# Patient Record
Sex: MALE | Race: WHITE | NOT HISPANIC OR LATINO | Employment: FULL TIME | URBAN - METROPOLITAN AREA
[De-identification: names, ages, dates, MRNs, and addresses within clinical notes are randomized per-mention and may not be internally consistent; named-entity substitution may affect disease eponyms.]

---

## 2019-05-14 ENCOUNTER — OCCMED (OUTPATIENT)
Dept: URGENT CARE | Facility: CLINIC | Age: 59
End: 2019-05-14

## 2019-05-14 ENCOUNTER — APPOINTMENT (OUTPATIENT)
Dept: RADIOLOGY | Facility: CLINIC | Age: 59
End: 2019-05-14
Payer: OTHER MISCELLANEOUS

## 2019-05-14 DIAGNOSIS — M25.561 ACUTE PAIN OF RIGHT KNEE: ICD-10-CM

## 2019-05-14 DIAGNOSIS — M25.561 ACUTE PAIN OF RIGHT KNEE: Primary | ICD-10-CM

## 2019-05-14 PROCEDURE — 73564 X-RAY EXAM KNEE 4 OR MORE: CPT

## 2019-05-16 ENCOUNTER — OCCMED (OUTPATIENT)
Dept: URGENT CARE | Facility: CLINIC | Age: 59
End: 2019-05-16
Payer: OTHER MISCELLANEOUS

## 2019-05-16 DIAGNOSIS — M25.561 ACUTE PAIN OF RIGHT KNEE: Primary | ICD-10-CM

## 2019-05-16 PROCEDURE — 99213 OFFICE O/P EST LOW 20 MIN: CPT | Performed by: PHYSICIAN ASSISTANT

## 2019-05-20 ENCOUNTER — OCCMED (OUTPATIENT)
Dept: URGENT CARE | Facility: CLINIC | Age: 59
End: 2019-05-20
Payer: OTHER MISCELLANEOUS

## 2019-05-20 DIAGNOSIS — M25.561 ACUTE PAIN OF RIGHT KNEE: Primary | ICD-10-CM

## 2019-05-20 PROCEDURE — 99213 OFFICE O/P EST LOW 20 MIN: CPT | Performed by: NURSE PRACTITIONER

## 2019-06-03 ENCOUNTER — OCCMED (OUTPATIENT)
Dept: URGENT CARE | Facility: CLINIC | Age: 59
End: 2019-06-03
Payer: OTHER MISCELLANEOUS

## 2019-06-03 ENCOUNTER — TRANSCRIBE ORDERS (OUTPATIENT)
Dept: ADMINISTRATIVE | Facility: HOSPITAL | Age: 59
End: 2019-06-03

## 2019-06-03 DIAGNOSIS — M25.561 ACUTE PAIN OF RIGHT KNEE: Primary | ICD-10-CM

## 2019-06-03 DIAGNOSIS — M25.561 RIGHT KNEE PAIN, UNSPECIFIED CHRONICITY: Primary | ICD-10-CM

## 2019-06-03 PROCEDURE — 99213 OFFICE O/P EST LOW 20 MIN: CPT | Performed by: NURSE PRACTITIONER

## 2019-06-12 ENCOUNTER — HOSPITAL ENCOUNTER (OUTPATIENT)
Dept: MRI IMAGING | Facility: HOSPITAL | Age: 59
Discharge: HOME | End: 2019-06-12
Payer: OTHER MISCELLANEOUS

## 2019-06-12 DIAGNOSIS — M25.561 RIGHT KNEE PAIN, UNSPECIFIED CHRONICITY: ICD-10-CM

## 2019-06-12 PROCEDURE — 73721 MRI JNT OF LWR EXTRE W/O DYE: CPT

## 2019-06-18 ENCOUNTER — APPOINTMENT (OUTPATIENT)
Dept: RADIOLOGY | Facility: CLINIC | Age: 59
End: 2019-06-18
Payer: OTHER MISCELLANEOUS

## 2019-06-18 ENCOUNTER — OFFICE VISIT (OUTPATIENT)
Dept: OBGYN CLINIC | Facility: CLINIC | Age: 59
End: 2019-06-18
Payer: OTHER MISCELLANEOUS

## 2019-06-18 VITALS
WEIGHT: 274 LBS | HEART RATE: 84 BPM | SYSTOLIC BLOOD PRESSURE: 132 MMHG | DIASTOLIC BLOOD PRESSURE: 82 MMHG | HEIGHT: 71 IN | BODY MASS INDEX: 38.36 KG/M2

## 2019-06-18 DIAGNOSIS — M25.461 EFFUSION OF RIGHT KNEE: ICD-10-CM

## 2019-06-18 DIAGNOSIS — S89.91XA KNEE INJURY, RIGHT, INITIAL ENCOUNTER: ICD-10-CM

## 2019-06-18 DIAGNOSIS — M17.11 PRIMARY OSTEOARTHRITIS OF RIGHT KNEE: Primary | ICD-10-CM

## 2019-06-18 PROCEDURE — 73564 X-RAY EXAM KNEE 4 OR MORE: CPT

## 2019-06-18 PROCEDURE — 99203 OFFICE O/P NEW LOW 30 MIN: CPT | Performed by: ORTHOPAEDIC SURGERY

## 2019-06-18 PROCEDURE — 20610 DRAIN/INJ JOINT/BURSA W/O US: CPT | Performed by: ORTHOPAEDIC SURGERY

## 2019-06-18 RX ORDER — COLCHICINE 0.6 MG/1
0.6 TABLET, FILM COATED ORAL DAILY
Refills: 3 | COMMUNITY
Start: 2019-06-05 | End: 2022-04-20

## 2019-06-18 RX ORDER — ALLOPURINOL 100 MG/1
200 TABLET ORAL DAILY
Refills: 3 | COMMUNITY
Start: 2019-05-20

## 2019-06-18 RX ORDER — METFORMIN HYDROCHLORIDE 500 MG/1
TABLET, EXTENDED RELEASE ORAL
Refills: 2 | COMMUNITY
Start: 2019-05-20

## 2019-06-18 RX ORDER — BETAMETHASONE SODIUM PHOSPHATE AND BETAMETHASONE ACETATE 3; 3 MG/ML; MG/ML
6 INJECTION, SUSPENSION INTRA-ARTICULAR; INTRALESIONAL; INTRAMUSCULAR; SOFT TISSUE
Status: COMPLETED | OUTPATIENT
Start: 2019-06-18 | End: 2019-06-18

## 2019-06-18 RX ORDER — EXENATIDE 2 MG/.65ML
INJECTION, SUSPENSION, EXTENDED RELEASE SUBCUTANEOUS
Refills: 2 | COMMUNITY
Start: 2019-05-29

## 2019-06-18 RX ORDER — DESLORATADINE 5 MG/1
5 TABLET ORAL DAILY
Refills: 4 | COMMUNITY
Start: 2019-06-05 | End: 2022-04-20

## 2019-06-18 RX ORDER — SIMVASTATIN 80 MG
80 TABLET ORAL EVERY EVENING
Refills: 4 | COMMUNITY
Start: 2019-05-20 | End: 2022-04-20

## 2019-06-18 RX ORDER — LIDOCAINE HYDROCHLORIDE 10 MG/ML
7 INJECTION, SOLUTION EPIDURAL; INFILTRATION; INTRACAUDAL; PERINEURAL
Status: COMPLETED | OUTPATIENT
Start: 2019-06-18 | End: 2019-06-18

## 2019-06-18 RX ORDER — LISINOPRIL AND HYDROCHLOROTHIAZIDE 12.5; 1 MG/1; MG/1
1 TABLET ORAL DAILY
Refills: 2 | COMMUNITY
Start: 2019-06-05

## 2019-06-18 RX ORDER — METHOTREXATE 2.5 MG/1
TABLET ORAL
Refills: 1 | COMMUNITY
Start: 2019-06-07 | End: 2022-04-20

## 2019-06-18 RX ORDER — FEXOFENADINE HCL 180 MG/1
180 TABLET ORAL DAILY
COMMUNITY

## 2019-06-18 RX ADMIN — BETAMETHASONE SODIUM PHOSPHATE AND BETAMETHASONE ACETATE 6 MG: 3; 3 INJECTION, SUSPENSION INTRA-ARTICULAR; INTRALESIONAL; INTRAMUSCULAR; SOFT TISSUE at 08:57

## 2019-06-18 RX ADMIN — LIDOCAINE HYDROCHLORIDE 7 ML: 10 INJECTION, SOLUTION EPIDURAL; INFILTRATION; INTRACAUDAL; PERINEURAL at 08:57

## 2019-06-19 ENCOUNTER — TELEPHONE (OUTPATIENT)
Dept: OBGYN CLINIC | Facility: HOSPITAL | Age: 59
End: 2019-06-19

## 2019-07-11 ENCOUNTER — OFFICE VISIT (OUTPATIENT)
Dept: OBGYN CLINIC | Facility: CLINIC | Age: 59
End: 2019-07-11
Payer: OTHER MISCELLANEOUS

## 2019-07-11 VITALS
SYSTOLIC BLOOD PRESSURE: 132 MMHG | BODY MASS INDEX: 38.5 KG/M2 | HEIGHT: 71 IN | WEIGHT: 275 LBS | DIASTOLIC BLOOD PRESSURE: 80 MMHG | HEART RATE: 80 BPM

## 2019-07-11 DIAGNOSIS — M17.11 PRIMARY OSTEOARTHRITIS OF RIGHT KNEE: Primary | ICD-10-CM

## 2019-07-11 DIAGNOSIS — M25.461 EFFUSION OF RIGHT KNEE: ICD-10-CM

## 2019-07-11 PROCEDURE — 99213 OFFICE O/P EST LOW 20 MIN: CPT | Performed by: PHYSICIAN ASSISTANT

## 2019-07-11 NOTE — PROGRESS NOTES
Assessment:     1  Primary osteoarthritis of right knee    2  Effusion of right knee        Plan:     Problem List Items Addressed This Visit        Musculoskeletal and Integument    Primary osteoarthritis of right knee - Primary    Relevant Medications    diclofenac sodium (VOLTAREN) 1 %    Effusion of right knee    Relevant Medications    diclofenac sodium (VOLTAREN) 1 %            Findings consistent with right knee osteoarthritis with effusion possible degenerative medial meniscus tear  Discussed findings and treatment options with the patient  Patient had very short-term relief with the cortisone injection  Pain and swelling has returned  He continues to experience mechanical symptoms  Recommend follow-up with Dr Janel Bojorquez next week to discuss possible right knee arthroscopy  I prescribed him Voltaren gel to use as needed for pain since he is limited on taking oral NSAIDs  Continue current work restrictions and use of hinged knee brace  All patient's questions were answered to his satisfaction  This note is created using dictation transcription  It may contain typographical errors, grammatical errors, improperly dictated words, background noise and other errors  Subjective:     Patient ID: Marifer Benavides is a 61 y o  male  Chief Complaint:   58-year-old gentleman following up for right knee osteoarthritis and effusion with possible degenerative medial meniscus tear  He had a new onset of right knee pain started 5/14/2019 at work  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  He start having pain and difficulty walking using the right leg  Patient had prior history of knee arthroscopy x2 and has been doing well with occasional discomfort in the right knee  He was given an aspiration with cortisone injection to the right knee joint 1 month ago  He states he had some relief for 2-3 days and then the pain return  Some of the swelling has returned as well    He has been using the hinged knee brace that does help him walk  He has mostly constant throbbing and aching pain, but he also continues to have consistent sharp pain over the medial and lateral aspects of his knee especially with any pivoting  He has a sensation is knee is going to give out on him at times  His left knee is starting to bother him due to over compensation  He is working with restrictions  Allergy:  No Known Allergies  Medications:  all current active meds have been reviewed  Past Medical History:  Past Medical History:   Diagnosis Date    Arthritis     Diabetes insipidus ([address] )     Hypertension      Past Surgical History:  Past Surgical History:   Procedure Laterality Date    HAND SURGERY Left 02/2019     Family History:  Family History   Problem Relation Age of Onset    Diabetes Father     Heart disease Father     Hypertension Father      Social History:  Social History     Substance and Sexual Activity   Alcohol Use Yes    Comment: social     Social History     Substance and Sexual Activity   Drug Use Never     Social History     Tobacco Use   Smoking Status Former Smoker   Smokeless Tobacco Never Used   Tobacco Comment    quit 17 years ago     Review of Systems   Constitutional: Negative  HENT: Negative  Eyes: Negative  Respiratory: Negative  Cardiovascular: Negative  Gastrointestinal: Negative  Endocrine: Negative  Genitourinary: Negative  Musculoskeletal: Positive for arthralgias (  Right knee), gait problem ( antalgic) and joint swelling ( right knee)  Skin: Negative  Hematological: Negative  Psychiatric/Behavioral: Negative  Objective:  BP Readings from Last 1 Encounters:   07/11/19 132/80      Wt Readings from Last 1 Encounters:   07/11/19 125 kg (275 lb)      BMI:   Estimated body mass index is 38 35 kg/m² as calculated from the following:    Height as of this encounter: 5' 11" (1 803 m)      Weight as of this encounter: 125 kg (275 lb)   BSA:   Estimated body surface area is 2 42 meters squared as calculated from the following:    Height as of this encounter: 5' 11" (1 803 m)  Weight as of this encounter: 125 kg (275 lb)  Physical Exam   Constitutional: He is oriented to person, place, and time  He appears well-developed  HENT:   Head: Normocephalic and atraumatic  Eyes: Conjunctivae and EOM are normal    Neck: Neck supple  Pulmonary/Chest: Effort normal    Musculoskeletal:        Right knee: He exhibits effusion ( grade 1)  Neurological: He is alert and oriented to person, place, and time  Skin: Skin is warm  Psychiatric: He has a normal mood and affect  Nursing note and vitals reviewed  Right Knee Exam     Tenderness   The patient is experiencing tenderness in the medial joint line and lateral joint line  Range of Motion   Extension: -5 ( pain)   Flexion: 100 ( pain)     Tests   Missael:  Medial - positive Lateral - positive  Varus: negative Valgus: negative  Patellar apprehension: negative    Other   Erythema: absent  Sensation: normal  Pulse: present  Swelling: mild  Effusion: effusion ( grade 1) present            No imaging today       Procedures

## 2019-07-18 ENCOUNTER — OFFICE VISIT (OUTPATIENT)
Dept: OBGYN CLINIC | Facility: CLINIC | Age: 59
End: 2019-07-18
Payer: OTHER MISCELLANEOUS

## 2019-07-18 VITALS
SYSTOLIC BLOOD PRESSURE: 138 MMHG | HEIGHT: 71 IN | DIASTOLIC BLOOD PRESSURE: 72 MMHG | BODY MASS INDEX: 38.92 KG/M2 | WEIGHT: 278 LBS

## 2019-07-18 DIAGNOSIS — S83.241D OTHER TEAR OF MEDIAL MENISCUS OF RIGHT KNEE AS CURRENT INJURY, SUBSEQUENT ENCOUNTER: Primary | ICD-10-CM

## 2019-07-18 DIAGNOSIS — M17.11 PRIMARY OSTEOARTHRITIS OF RIGHT KNEE: ICD-10-CM

## 2019-07-18 PROBLEM — S83.241A TEAR OF MEDIAL MENISCUS OF RIGHT KNEE, CURRENT: Status: ACTIVE | Noted: 2019-07-18

## 2019-07-18 LAB — HBA1C MFR BLD HPLC: 7.4 %

## 2019-07-18 PROCEDURE — 99214 OFFICE O/P EST MOD 30 MIN: CPT | Performed by: ORTHOPAEDIC SURGERY

## 2019-07-18 RX ORDER — SODIUM CHLORIDE, SODIUM LACTATE, POTASSIUM CHLORIDE, CALCIUM CHLORIDE 600; 310; 30; 20 MG/100ML; MG/100ML; MG/100ML; MG/100ML
75 INJECTION, SOLUTION INTRAVENOUS CONTINUOUS
Status: CANCELLED | OUTPATIENT
Start: 2019-08-09

## 2019-07-18 RX ORDER — CHLORHEXIDINE GLUCONATE 4 G/100ML
SOLUTION TOPICAL DAILY PRN
Status: CANCELLED | OUTPATIENT
Start: 2019-08-09

## 2019-07-18 RX ORDER — CEFAZOLIN SODIUM 1 G/50ML
1000 SOLUTION INTRAVENOUS ONCE
Status: CANCELLED | OUTPATIENT
Start: 2019-08-09 | End: 2019-07-18

## 2019-07-18 NOTE — ASSESSMENT & PLAN NOTE
Findings consistent with recurrent medial meniscus tear  Discussed findings and treatment options with the patient  Reviewed patient's right knee MRI with him  I discussed prognosis of his knee condition  Due to continued pain in right knee due to medial meniscal tear with mechanical symptoms of giving out and buckling patient will be set up for arthroscopy partial medial meniscectomy with possible microfracture  He has MRI in history confirming medial meniscal tear  Limited NSAID use due to medical history, failed cortisone injection, tylenol, hinged knee brace  Discussed arthroscopy will only help meniscal pain, not any pain related to osteoarthritis, patient understands  Risks of surgery, dvt, infection, nerve damage, stroke, post op bleeding, stiffness, pain  Outpatient procedure and he can weight bear as tolerated, crutches will be given  Hold therapy until first post op visit  Consent signed  All patient's questions were answered to his satisfaction  This note is created using dictation transcription  It may contain typographical errors, grammatical errors, improperly dictated words, background noise and other errors

## 2019-07-18 NOTE — PROGRESS NOTES
Assessment:     1  Other tear of medial meniscus of right knee as current injury, subsequent encounter    2  Primary osteoarthritis of right knee        Plan:      Problem List Items Addressed This Visit        Musculoskeletal and Integument    Primary osteoarthritis of right knee    Tear of medial meniscus of right knee, current - Primary     Findings consistent with recurrent medial meniscus tear  Discussed findings and treatment options with the patient  Reviewed patient's right knee MRI with him  I discussed prognosis of his knee condition  Due to continued pain in right knee due to medial meniscal tear with mechanical symptoms of giving out and buckling patient will be set up for arthroscopy partial medial meniscectomy with possible microfracture  He has MRI in history confirming medial meniscal tear  Limited NSAID use due to medical history, failed cortisone injection, tylenol, hinged knee brace  Discussed arthroscopy will only help meniscal pain, not any pain related to osteoarthritis, patient understands  Risks of surgery, dvt, infection, nerve damage, stroke, post op bleeding, stiffness, pain  Outpatient procedure and he can weight bear as tolerated, crutches will be given  Hold therapy until first post op visit  Consent signed  All patient's questions were answered to his satisfaction  This note is created using dictation transcription  It may contain typographical errors, grammatical errors, improperly dictated words, background noise and other errors  Relevant Orders    Crutches    Case request operating room: ARTHROSCOPY KNEE, RIGHT PARTIAL MEDIAL MENISCECTOMY (Completed)    Ambulatory referral to Internal Medicine    Basic metabolic panel    CBC and differential    APTT    Protime-INR    EKG 12 lead         Subjective:     Patient ID: Suzi Milligan is a 61 y o  male    Chief Complaint:   70-year-old male following up for right knee osteoarthritis, medial meniscal tear and effusion  5/14/2019 at work  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 right knee  He was given cortisone injection 6/18/19 with minimal relief of symptoms one to two days  MRI in history confirms medial meniscal tear and osteoarthritis worse in medial compartment  Patient is wearing hinged knee brace for support/stability due to episodes of buckling and giving out  He continues with daily aching/throbbing in his knee with sharp acute pain in medial and lateral  joint line, he avoids pivoting or twisting motions as they further aggravate his knee  He has history 2 arthroscopies which he was doing well after up until injury at work 5/14/19  He is using voltaren gel, limited use of NSAIDs due to health reasons  He is working with restrictions  His left knee is now being bothered due to compensating from right  Allergy:  No Known Allergies  Medications:  all current active meds have been reviewed  Past Medical History:  Past Medical History:   Diagnosis Date    Arthritis     Diabetes insipidus ([address] )     Hypertension      Past Surgical History:  Past Surgical History:   Procedure Laterality Date    HAND SURGERY Left 02/2019     Family History:  Family History   Problem Relation Age of Onset    Diabetes Father     Heart disease Father     Hypertension Father      Social History:  Social History     Substance and Sexual Activity   Alcohol Use Yes    Comment: social     Social History     Substance and Sexual Activity   Drug Use Never     Social History     Tobacco Use   Smoking Status Former Smoker   Smokeless Tobacco Never Used   Tobacco Comment    quit 17 years ago     Review of Systems   Constitutional: Negative for chills and fever  HENT: Negative for drooling and sneezing  Eyes: Negative for redness  Respiratory: Negative for cough, shortness of breath and wheezing  Cardiovascular: Negative  Gastrointestinal: Negative  Endocrine: Negative  Genitourinary: Negative  Musculoskeletal: Positive for arthralgias (Right knee), gait problem (Antalgic) and joint swelling (Right knee)  Hematological: Negative  Psychiatric/Behavioral: The patient is not nervous/anxious  Objective:  BP Readings from Last 1 Encounters:   07/18/19 138/72      Wt Readings from Last 1 Encounters:   07/18/19 126 kg (278 lb)      BMI:   Estimated body mass index is 38 77 kg/m² as calculated from the following:    Height as of this encounter: 5' 11" (1 803 m)  Weight as of this encounter: 126 kg (278 lb)  BSA:   Estimated body surface area is 2 42 meters squared as calculated from the following:    Height as of this encounter: 5' 11" (1 803 m)  Weight as of this encounter: 126 kg (278 lb)  Physical Exam   Constitutional: He is oriented to person, place, and time  He appears well-developed and well-nourished  HENT:   Head: Normocephalic and atraumatic  Eyes: Pupils are equal, round, and reactive to light  Conjunctivae and EOM are normal    Neck: Normal range of motion  Neck supple  Cardiovascular: Regular rhythm and normal heart sounds  Exam reveals no gallop  No murmur heard  Pulmonary/Chest: Breath sounds normal  He has no wheezes  He has no rales  Abdominal: Soft  Musculoskeletal:        Right knee: He exhibits effusion (Grade 2)  Neurological: He is alert and oriented to person, place, and time  He has normal reflexes  Skin: Skin is warm and dry  Psychiatric: He has a normal mood and affect  His behavior is normal  Judgment and thought content normal    Nursing note and vitals reviewed  Right Knee Exam     Muscle Strength   The patient has normal right knee strength  Tenderness   The patient is experiencing tenderness in the medial joint line and lateral joint line      Range of Motion   Extension: 0   Flexion: 110     Tests   Missael:  Medial - positive Lateral - positive  Varus: negative Valgus: negative  Lachman:  Anterior - negative    Posterior - negative  Drawer:  Anterior - negative        Other   Erythema: absent  Scars: present (well healed portal sites)  Sensation: normal  Pulse: present  Swelling: mild  Effusion: effusion (Grade 2) present            I have personally reviewed pertinent films in PACS and my interpretation is Right knee MRI show bone edema in the medial femoral condyle and medial tibial plateau  There is a tear in the medial meniscus posterior horn consistent with recurrent  Effusion and a loose body are present       Scribe Attestation    I,:   Alia Dawn am acting as a scribe while in the presence of the attending physician :        I,:   Payam Segura MD personally performed the services described in this documentation    as scribed in my presence :

## 2019-07-18 NOTE — H&P (VIEW-ONLY)
Assessment:     1  Other tear of medial meniscus of right knee as current injury, subsequent encounter    2  Primary osteoarthritis of right knee        Plan:      Problem List Items Addressed This Visit        Musculoskeletal and Integument    Primary osteoarthritis of right knee    Tear of medial meniscus of right knee, current - Primary     Findings consistent with recurrent medial meniscus tear  Discussed findings and treatment options with the patient  Reviewed patient's right knee MRI with him  I discussed prognosis of his knee condition  Due to continued pain in right knee due to medial meniscal tear with mechanical symptoms of giving out and buckling patient will be set up for arthroscopy partial medial meniscectomy with possible microfracture  He has MRI in history confirming medial meniscal tear  Limited NSAID use due to medical history, failed cortisone injection, tylenol, hinged knee brace  Discussed arthroscopy will only help meniscal pain, not any pain related to osteoarthritis, patient understands  Risks of surgery, dvt, infection, nerve damage, stroke, post op bleeding, stiffness, pain  Outpatient procedure and he can weight bear as tolerated, crutches will be given  Hold therapy until first post op visit  Consent signed  All patient's questions were answered to his satisfaction  This note is created using dictation transcription  It may contain typographical errors, grammatical errors, improperly dictated words, background noise and other errors  Relevant Orders    Crutches    Case request operating room: ARTHROSCOPY KNEE, RIGHT PARTIAL MEDIAL MENISCECTOMY (Completed)    Ambulatory referral to Internal Medicine    Basic metabolic panel    CBC and differential    APTT    Protime-INR    EKG 12 lead         Subjective:     Patient ID: Alla Francisco is a 61 y o  male    Chief Complaint:   22-year-old male following up for right knee osteoarthritis, medial meniscal tear and effusion  5/14/2019 at work  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 right knee  He was given cortisone injection 6/18/19 with minimal relief of symptoms one to two days  MRI in history confirms medial meniscal tear and osteoarthritis worse in medial compartment  Patient is wearing hinged knee brace for support/stability due to episodes of buckling and giving out  He continues with daily aching/throbbing in his knee with sharp acute pain in medial and lateral  joint line, he avoids pivoting or twisting motions as they further aggravate his knee  He has history 2 arthroscopies which he was doing well after up until injury at work 5/14/19  He is using voltaren gel, limited use of NSAIDs due to health reasons  He is working with restrictions  His left knee is now being bothered due to compensating from right  Allergy:  No Known Allergies  Medications:  all current active meds have been reviewed  Past Medical History:  Past Medical History:   Diagnosis Date    Arthritis     Diabetes insipidus ([address] )     Hypertension      Past Surgical History:  Past Surgical History:   Procedure Laterality Date    HAND SURGERY Left 02/2019     Family History:  Family History   Problem Relation Age of Onset    Diabetes Father     Heart disease Father     Hypertension Father      Social History:  Social History     Substance and Sexual Activity   Alcohol Use Yes    Comment: social     Social History     Substance and Sexual Activity   Drug Use Never     Social History     Tobacco Use   Smoking Status Former Smoker   Smokeless Tobacco Never Used   Tobacco Comment    quit 17 years ago     Review of Systems   Constitutional: Negative for chills and fever  HENT: Negative for drooling and sneezing  Eyes: Negative for redness  Respiratory: Negative for cough, shortness of breath and wheezing  Cardiovascular: Negative  Gastrointestinal: Negative  Endocrine: Negative  Genitourinary: Negative  Musculoskeletal: Positive for arthralgias (Right knee), gait problem (Antalgic) and joint swelling (Right knee)  Hematological: Negative  Psychiatric/Behavioral: The patient is not nervous/anxious  Objective:  BP Readings from Last 1 Encounters:   07/18/19 138/72      Wt Readings from Last 1 Encounters:   07/18/19 126 kg (278 lb)      BMI:   Estimated body mass index is 38 77 kg/m² as calculated from the following:    Height as of this encounter: 5' 11" (1 803 m)  Weight as of this encounter: 126 kg (278 lb)  BSA:   Estimated body surface area is 2 42 meters squared as calculated from the following:    Height as of this encounter: 5' 11" (1 803 m)  Weight as of this encounter: 126 kg (278 lb)  Physical Exam   Constitutional: He is oriented to person, place, and time  He appears well-developed and well-nourished  HENT:   Head: Normocephalic and atraumatic  Eyes: Pupils are equal, round, and reactive to light  Conjunctivae and EOM are normal    Neck: Normal range of motion  Neck supple  Cardiovascular: Regular rhythm and normal heart sounds  Exam reveals no gallop  No murmur heard  Pulmonary/Chest: Breath sounds normal  He has no wheezes  He has no rales  Abdominal: Soft  Musculoskeletal:        Right knee: He exhibits effusion (Grade 2)  Neurological: He is alert and oriented to person, place, and time  He has normal reflexes  Skin: Skin is warm and dry  Psychiatric: He has a normal mood and affect  His behavior is normal  Judgment and thought content normal    Nursing note and vitals reviewed  Right Knee Exam     Muscle Strength   The patient has normal right knee strength  Tenderness   The patient is experiencing tenderness in the medial joint line and lateral joint line      Range of Motion   Extension: 0   Flexion: 110     Tests   Missael:  Medial - positive Lateral - positive  Varus: negative Valgus: negative  Lachman:  Anterior - negative    Posterior - negative  Drawer:  Anterior - negative        Other   Erythema: absent  Scars: present (well healed portal sites)  Sensation: normal  Pulse: present  Swelling: mild  Effusion: effusion (Grade 2) present            I have personally reviewed pertinent films in PACS and my interpretation is Right knee MRI show bone edema in the medial femoral condyle and medial tibial plateau  There is a tear in the medial meniscus posterior horn consistent with recurrent  Effusion and a loose body are present       Scribe Attestation    I,:   Gabriella Quinones am acting as a scribe while in the presence of the attending physician :        I,:   Jey Emery MD personally performed the services described in this documentation    as scribed in my presence :

## 2019-07-18 NOTE — LETTER
July 18, 2019     Patient: Remberto Cherry   YOB: 1960   Date of Visit: 7/18/2019       To Whom it May Concern:    Remberto Cherry is under my professional care  He was seen in my office on 7/18/2019  He has the following restrictions up until surgical date, no climbing, no crawling, no squatting no forklifts, max lifting 10lbs, max 8 hr work shift in a day 5 days a week  If you have any questions or concerns, please don't hesitate to call           Sincerely,          Eliseo Guerrero MD        CC: No Recipients

## 2019-07-22 ENCOUNTER — HOSPITAL ENCOUNTER (OUTPATIENT)
Dept: NON INVASIVE DIAGNOSTICS | Facility: HOSPITAL | Age: 59
Discharge: HOME | End: 2019-07-22
Attending: ORTHOPAEDIC SURGERY
Payer: OTHER MISCELLANEOUS

## 2019-07-22 DIAGNOSIS — S83.241D OTHER TEAR OF MEDIAL MENISCUS OF RIGHT KNEE AS CURRENT INJURY, SUBSEQUENT ENCOUNTER: ICD-10-CM

## 2019-07-22 PROCEDURE — 93005 ELECTROCARDIOGRAM TRACING: CPT

## 2019-07-24 LAB
ATRIAL RATE: 77 BPM
P AXIS: 27 DEGREES
PR INTERVAL: 148 MS
QRS AXIS: -27 DEGREES
QRSD INTERVAL: 86 MS
QT INTERVAL: 372 MS
QTC INTERVAL: 420 MS
T WAVE AXIS: 0 DEGREES
VENTRICULAR RATE: 77 BPM

## 2019-07-24 PROCEDURE — 93010 ELECTROCARDIOGRAM REPORT: CPT | Performed by: INTERNAL MEDICINE

## 2019-07-26 ENCOUNTER — TELEPHONE (OUTPATIENT)
Dept: OBGYN CLINIC | Facility: HOSPITAL | Age: 59
End: 2019-07-26

## 2019-07-26 NOTE — TELEPHONE ENCOUNTER
Patient has surgery with Dr Megan Laughlin on 08/09  His PCP is calling for lab results and the EKG results   Please fax to #[phone]

## 2019-08-05 ENCOUNTER — TELEPHONE (OUTPATIENT)
Dept: OBGYN CLINIC | Facility: HOSPITAL | Age: 59
End: 2019-08-05

## 2019-08-06 RX ORDER — MULTIVITAMIN
1 CAPSULE ORAL DAILY
COMMUNITY

## 2019-08-06 RX ORDER — FOLIC ACID 1 MG/1
1000 TABLET ORAL DAILY
Refills: 3 | COMMUNITY
Start: 2019-07-10

## 2019-08-07 ENCOUNTER — ANESTHESIA EVENT (OUTPATIENT)
Dept: PERIOP | Facility: HOSPITAL | Age: 59
End: 2019-08-07
Payer: OTHER MISCELLANEOUS

## 2019-08-09 ENCOUNTER — HOSPITAL ENCOUNTER (OUTPATIENT)
Facility: HOSPITAL | Age: 59
Setting detail: OUTPATIENT SURGERY
Discharge: HOME | End: 2019-08-09
Attending: ORTHOPAEDIC SURGERY | Admitting: ORTHOPAEDIC SURGERY
Payer: OTHER MISCELLANEOUS

## 2019-08-09 ENCOUNTER — ANESTHESIA (OUTPATIENT)
Dept: PERIOP | Facility: HOSPITAL | Age: 59
End: 2019-08-09
Payer: OTHER MISCELLANEOUS

## 2019-08-09 VITALS
WEIGHT: 272.2 LBS | SYSTOLIC BLOOD PRESSURE: 120 MMHG | OXYGEN SATURATION: 97 % | BODY MASS INDEX: 38.11 KG/M2 | DIASTOLIC BLOOD PRESSURE: 75 MMHG | RESPIRATION RATE: 18 BRPM | HEART RATE: 77 BPM | HEIGHT: 71 IN | TEMPERATURE: 98 F

## 2019-08-09 DIAGNOSIS — S83.211D BUCKET-HANDLE TEAR OF MEDIAL MENISCUS OF RIGHT KNEE AS CURRENT INJURY, SUBSEQUENT ENCOUNTER: Primary | ICD-10-CM

## 2019-08-09 LAB
GLUCOSE SERPL-MCNC: 146 MG/DL (ref 65–140)
GLUCOSE SERPL-MCNC: 147 MG/DL (ref 65–140)

## 2019-08-09 PROCEDURE — 29881 ARTHRS KNE SRG MNISECTMY M/L: CPT | Performed by: PHYSICIAN ASSISTANT

## 2019-08-09 PROCEDURE — 29881 ARTHRS KNE SRG MNISECTMY M/L: CPT | Performed by: ORTHOPAEDIC SURGERY

## 2019-08-09 PROCEDURE — NC001 PR NO CHARGE: Performed by: PHYSICIAN ASSISTANT

## 2019-08-09 PROCEDURE — 82948 REAGENT STRIP/BLOOD GLUCOSE: CPT

## 2019-08-09 RX ORDER — LIDOCAINE HYDROCHLORIDE 10 MG/ML
INJECTION, SOLUTION INFILTRATION; PERINEURAL AS NEEDED
Status: DISCONTINUED | OUTPATIENT
Start: 2019-08-09 | End: 2019-08-09 | Stop reason: SURG

## 2019-08-09 RX ORDER — OXYCODONE HYDROCHLORIDE AND ACETAMINOPHEN 5; 325 MG/1; MG/1
1 TABLET ORAL EVERY 4 HOURS PRN
Qty: 20 TABLET | Refills: 0 | Status: SHIPPED | OUTPATIENT
Start: 2019-08-09 | End: 2019-08-19

## 2019-08-09 RX ORDER — DEXAMETHASONE SODIUM PHOSPHATE 10 MG/ML
INJECTION, SOLUTION INTRAMUSCULAR; INTRAVENOUS AS NEEDED
Status: DISCONTINUED | OUTPATIENT
Start: 2019-08-09 | End: 2019-08-09 | Stop reason: SURG

## 2019-08-09 RX ORDER — EPHEDRINE SULFATE 50 MG/ML
INJECTION INTRAVENOUS AS NEEDED
Status: DISCONTINUED | OUTPATIENT
Start: 2019-08-09 | End: 2019-08-09 | Stop reason: SURG

## 2019-08-09 RX ORDER — FENTANYL CITRATE/PF 50 MCG/ML
25 SYRINGE (ML) INJECTION
Status: DISCONTINUED | OUTPATIENT
Start: 2019-08-09 | End: 2019-08-09 | Stop reason: HOSPADM

## 2019-08-09 RX ORDER — ONDANSETRON 2 MG/ML
INJECTION INTRAMUSCULAR; INTRAVENOUS AS NEEDED
Status: DISCONTINUED | OUTPATIENT
Start: 2019-08-09 | End: 2019-08-09 | Stop reason: SURG

## 2019-08-09 RX ORDER — MIDAZOLAM HYDROCHLORIDE 1 MG/ML
INJECTION INTRAMUSCULAR; INTRAVENOUS AS NEEDED
Status: DISCONTINUED | OUTPATIENT
Start: 2019-08-09 | End: 2019-08-09 | Stop reason: SURG

## 2019-08-09 RX ORDER — CEFAZOLIN SODIUM 1 G/50ML
1000 SOLUTION INTRAVENOUS ONCE
Status: COMPLETED | OUTPATIENT
Start: 2019-08-09 | End: 2019-08-09

## 2019-08-09 RX ORDER — DEXAMETHASONE SODIUM PHOSPHATE 4 MG/ML
4 INJECTION, SOLUTION INTRA-ARTICULAR; INTRALESIONAL; INTRAMUSCULAR; INTRAVENOUS; SOFT TISSUE ONCE
Status: DISCONTINUED | OUTPATIENT
Start: 2019-08-09 | End: 2019-08-09 | Stop reason: HOSPADM

## 2019-08-09 RX ORDER — PROPOFOL 10 MG/ML
INJECTION, EMULSION INTRAVENOUS AS NEEDED
Status: DISCONTINUED | OUTPATIENT
Start: 2019-08-09 | End: 2019-08-09 | Stop reason: SURG

## 2019-08-09 RX ORDER — ACETAMINOPHEN 325 MG/1
650 TABLET ORAL EVERY 6 HOURS PRN
Status: DISCONTINUED | OUTPATIENT
Start: 2019-08-09 | End: 2019-08-09 | Stop reason: HOSPADM

## 2019-08-09 RX ORDER — OXYCODONE HYDROCHLORIDE AND ACETAMINOPHEN 5; 325 MG/1; MG/1
1 TABLET ORAL EVERY 4 HOURS PRN
Status: DISCONTINUED | OUTPATIENT
Start: 2019-08-09 | End: 2019-08-09 | Stop reason: HOSPADM

## 2019-08-09 RX ORDER — BUPIVACAINE HYDROCHLORIDE AND EPINEPHRINE 5; 5 MG/ML; UG/ML
INJECTION, SOLUTION EPIDURAL; INTRACAUDAL; PERINEURAL AS NEEDED
Status: DISCONTINUED | OUTPATIENT
Start: 2019-08-09 | End: 2019-08-09 | Stop reason: HOSPADM

## 2019-08-09 RX ORDER — ONDANSETRON 2 MG/ML
4 INJECTION INTRAMUSCULAR; INTRAVENOUS EVERY 6 HOURS PRN
Status: DISCONTINUED | OUTPATIENT
Start: 2019-08-09 | End: 2019-08-09 | Stop reason: HOSPADM

## 2019-08-09 RX ORDER — GLYCOPYRROLATE 0.2 MG/ML
INJECTION INTRAMUSCULAR; INTRAVENOUS AS NEEDED
Status: DISCONTINUED | OUTPATIENT
Start: 2019-08-09 | End: 2019-08-09 | Stop reason: SURG

## 2019-08-09 RX ORDER — KETOROLAC TROMETHAMINE 30 MG/ML
INJECTION, SOLUTION INTRAMUSCULAR; INTRAVENOUS AS NEEDED
Status: DISCONTINUED | OUTPATIENT
Start: 2019-08-09 | End: 2019-08-09 | Stop reason: SURG

## 2019-08-09 RX ORDER — ONDANSETRON 2 MG/ML
4 INJECTION INTRAMUSCULAR; INTRAVENOUS ONCE
Status: DISCONTINUED | OUTPATIENT
Start: 2019-08-09 | End: 2019-08-09 | Stop reason: HOSPADM

## 2019-08-09 RX ORDER — CHLORHEXIDINE GLUCONATE 4 G/100ML
SOLUTION TOPICAL DAILY PRN
Status: DISCONTINUED | OUTPATIENT
Start: 2019-08-09 | End: 2019-08-09 | Stop reason: HOSPADM

## 2019-08-09 RX ORDER — FENTANYL CITRATE 50 UG/ML
INJECTION, SOLUTION INTRAMUSCULAR; INTRAVENOUS AS NEEDED
Status: DISCONTINUED | OUTPATIENT
Start: 2019-08-09 | End: 2019-08-09 | Stop reason: SURG

## 2019-08-09 RX ORDER — HYDROMORPHONE HCL/PF 1 MG/ML
0.5 SYRINGE (ML) INJECTION
Status: DISCONTINUED | OUTPATIENT
Start: 2019-08-09 | End: 2019-08-09 | Stop reason: HOSPADM

## 2019-08-09 RX ORDER — SODIUM CHLORIDE, SODIUM LACTATE, POTASSIUM CHLORIDE, CALCIUM CHLORIDE 600; 310; 30; 20 MG/100ML; MG/100ML; MG/100ML; MG/100ML
75 INJECTION, SOLUTION INTRAVENOUS CONTINUOUS
Status: DISCONTINUED | OUTPATIENT
Start: 2019-08-09 | End: 2019-08-09 | Stop reason: HOSPADM

## 2019-08-09 RX ADMIN — SODIUM CHLORIDE, SODIUM LACTATE, POTASSIUM CHLORIDE, AND CALCIUM CHLORIDE 75 ML/HR: .6; .31; .03; .02 INJECTION, SOLUTION INTRAVENOUS at 07:11

## 2019-08-09 RX ADMIN — ONDANSETRON 4 MG: 2 INJECTION INTRAMUSCULAR; INTRAVENOUS at 08:11

## 2019-08-09 RX ADMIN — OXYCODONE HYDROCHLORIDE AND ACETAMINOPHEN 1 TABLET: 5; 325 TABLET ORAL at 08:55

## 2019-08-09 RX ADMIN — KETOROLAC TROMETHAMINE 30 MG: 30 INJECTION, SOLUTION INTRAMUSCULAR; INTRAVENOUS at 08:14

## 2019-08-09 RX ADMIN — GLYCOPYRROLATE 0.2 MG: 0.2 INJECTION, SOLUTION INTRAMUSCULAR; INTRAVENOUS at 07:36

## 2019-08-09 RX ADMIN — FENTANYL CITRATE 50 MCG: 50 INJECTION, SOLUTION INTRAMUSCULAR; INTRAVENOUS at 07:40

## 2019-08-09 RX ADMIN — EPHEDRINE SULFATE 10 MG: 50 INJECTION, SOLUTION INTRAVENOUS at 07:59

## 2019-08-09 RX ADMIN — CEFAZOLIN SODIUM 1000 MG: 1 SOLUTION INTRAVENOUS at 07:36

## 2019-08-09 RX ADMIN — FENTANYL CITRATE 50 MCG: 50 INJECTION, SOLUTION INTRAMUSCULAR; INTRAVENOUS at 08:11

## 2019-08-09 RX ADMIN — DEXAMETHASONE SODIUM PHOSPHATE 4 MG: 10 INJECTION, SOLUTION INTRAMUSCULAR; INTRAVENOUS at 07:46

## 2019-08-09 RX ADMIN — MIDAZOLAM 2 MG: 1 INJECTION INTRAMUSCULAR; INTRAVENOUS at 07:36

## 2019-08-09 RX ADMIN — PROPOFOL 200 MG: 10 INJECTION, EMULSION INTRAVENOUS at 07:41

## 2019-08-09 RX ADMIN — LIDOCAINE HYDROCHLORIDE 20 MG: 10 INJECTION, SOLUTION INFILTRATION; PERINEURAL at 07:40

## 2019-08-09 NOTE — OP NOTE
OPERATIVE REPORT  PATIENT NAME: Kunal Neal    :  1960  MRN: [id number]  Pt Location:  OR ROOM 02    SURGERY DATE: 2019    Surgeon(s) and Role:     * Jey Emery MD - Primary     * Pedro Olmstead PA-C - Assisting    Preop Diagnosis:  Other tear of medial meniscus of right knee as current injury, subsequent encounter [S83 241D]    Post-Op Diagnosis Codes:     * Other tear of medial meniscus of right knee as current injury, subsequent encounter [S83 241D]    Procedure(s) (LRB):  ARTHROSCOPY KNEE, RIGHT PARTIAL MEDIAL MENISCECTOMY,RESECTION ENTEROMEDIAL PLICA (Right)    Specimen(s):  * No specimens in log *    Estimated Blood Loss:   Minimal    Drains:  * No LDAs found *    Anesthesia Type:   LMA    Operative Indications: Other tear of medial meniscus of right knee as current injury, subsequent encounter Ervin Drew is a 61y o  years old male diagnosed with right knee medial meniscal tear  Patient failed conservative treatments and elected to proceed with surgical intervention  The risks and complications are discussed with the patient  The patient consented to the procedure  Procedure: Patient was brought into the OR and placed in supine position  Patient was anesthetized and intubated with LMA without any complications  Patient's right knee was prep and draped in sterile fashion with tourniquet in the upper thigh  A time out was call and identified the right knee was the operating site  3 portals were utilized for instrumentation  Each portal was injected with 1 cc of Marcaine 0 5% with epinephrine  A superior-lateral protal was use for the inflow which is set to 35 mmHg  The scope was introduced into the knee from the lateral portal  Inspection of the knee was carried out in clockwise fashion  A medial protal was also created for instrumentation  Patellofemoral joint showed grade 4 degenerative changes over lateral facet  Anterior medial plica was present and thicken   Medial compartment showed grade 3 degenerative changes  Medial meniscus was torn in the posterior horn extended into the root  ACL and PCL were intact  Lateral compartment showed grade 1-2 degenerative changes  Lateral meniscus was intact  Attention was turn to medial compartment and partial medial meniscectomy about 25% was done back to the stable rim  Anteromedial plica was also resected  Using mechanical shaver and biters to carry out the procedure  Excess fluid was drain out of the knee 25 cc of 0 5% Marcaine with epinephrine was injected into the knee joint for post-op pain control  All counts were correct  The incisions were closed with Nylon  A sterile bulky dressing was applied  The patient tolerated the procedure well without any complications  Patient was then extubated and transferred to recovery room for post-op care  The family was contacted  There was no qualified resident available to assist     Mrs Spicerfrederick Krueger was required in the OR in helping performing the procedures by manipulating the knee for visualization      Complications:   None    Patient Disposition:  PACU     SIGNATURE: Toby Hernandez MD  DATE: August 9, 2019  TIME: 8:14 AM

## 2019-08-09 NOTE — INTERVAL H&P NOTE
/79   Pulse 77   Temp 98 1 °F (36 7 °C) (Oral)   Resp 18   Ht 5' 11" (1 803 m)   Wt 123 kg (272 lb 3 2 oz)   SpO2 95%   BMI 37 96 kg/m²       H&P reviewed  After examining the patient I find no changes in the patients condition since the H&P had been written

## 2019-08-09 NOTE — DISCHARGE INSTRUCTIONS
The principal surgical findings in your knee were:    1  Right knee medial meniscus tear    2  Right knee anteromedial plica        The following corrective procedures were performed:     1  Anteromedial plica resection    2  Arthroscopic partial meniscectomy       FOLLOW-UP:     You will need an appt  in: As scheduled   Please call the office at   GENERAL INSTRUCTIONS:    ·  Apply an ice pack to your knee for the next 12-24 hours  ·  If crutches were prescribed, you should limit weight bearing at all times as instructed  Keep knee stiff the first day, avoid too much bending  ·  Take it easy for at least 24 hours  Do not drive for 3-5 days  You may move about, but stay around home or hotel  ·  If you have an upset stomach, take only cool, clear liquids, such as Gatorade, Jello, or Ginger ale  If nausea persists for more than 25 hours, notify my office  ·  Low grade temperature is not uncommon after surgery  However, if your temperature exceeds 101 degrees, please notify my office  ·  The Novocain in your knee will keep your knee numb until tonight  When the medicine wears off, you will feel pain for several days to one week  This is normal after arthroscopic surgery  ·  On the day after surgery, you may walk normally and bend the knee normally  ·       You may continue using a cane or crutches to minimize any discomfort the first 24 to 48 hours  ·  It is normal to have swelling and discomfort in the knee for several days to one week after arthroscopic surgery  ·  You should take on 325 mg enteric-coated aspirin in the morning and one tablet at night to help minimize the chance of developing phlebitis (clots in the vein)  This should also be taken with food or a glass of milk to avoid stomach upset  Examples of enteric-coated aspirin are Ecotrin, Ascriptin, Or Bufferin  DO NOT TAKE this if you are known to be allergic to it or have a prior history of stomach ulcer disease    NOTE:  If, after taking the enteric coated aspirin, you develop any pain in the stomach, nausea, vomiting, or stomach irritation, you should stop the medication immediately because it may cause stomach ulcers  Also, if you continue taking this medication while you are having pain in the stomach or nausea or stomach cramps, an ulcer could develop  ·       To reduce pain and swelling, place several pillows under your knee for the first 24 to 48 hours  The knee should be elevated above the heart  Ice should be applied to the knee during this period and this will help decrease discomfort and swelling  Apply to the knee for 30 minutes every 2 hours  ·       Any prescription you received before surgery or after surgery should be filled immediately, and taken according to directions on the label  Taking the medicine with food or with a glass of milk will avoid stomach upset  ·       Weight bearing as per instructions from the surgeon  EXERCISES:      Begin doing gentle exercises right away  Exercising will reduce the swelling, increase motion, and prevent muscle weakness  The following exercises should be performed during the first week and a half, following surgery  The advanced knee exercise program will be started when you are seen in the office  1  QUAD SETS: Straighten as straight as possible and then clench the thigh muscles tightly  Keep the muscles clenched tightly to the slow count of 3 then relax  Repeat this exercise 10-30 times every hour  2  STRAIGHT LEG RAISING: With the knee held straight and the quadriceps muscle contracted, raise your leg up 6 to 8 inches and hold it to the slow count of 3  Repeat this exercise 10-30 times every hour  3  RANGE OF MOTION: You should start bending your knee to the point of pain and increase bending it until full motion is obtained  Repeat this exercise 10-30 times every hour          For the first 48 hours inhale deeply and hold your breath for 3 seconds; exhale completely  Repeat 10 times, 4 times daily  If you smoke, avoid cigarettes for 48 hours  BANDAGES:     ·  Your bandage may show blood stains within 1-12 hours  This is motly fluid that was used to irrigate your knee, slightly tinged with blood  It is no cause for concern  However, if your bandage becomes saturated, notify my office right away  ·       You may remove the bulky dressings in 2 days and applied band aids to the small skin incisions  You may shower in 3 days after surgery  WORK:   Plan to take 3 or 4 days off from work  You can resume work when you are comfortable  (This can be a week or more depending on the type of work you do)  Do not walk or stand for excessive periods  Do not operate heavy machinery that requires pedals

## 2019-08-09 NOTE — ANESTHESIA PREPROCEDURE EVALUATION
Review of Systems/Medical History  Patient summary reviewed  Chart reviewed  History of anesthetic complications PONV    Cardiovascular  EKG reviewed, Negative cardio ROS Hyperlipidemia, Hypertension controlled,    Pulmonary  Negative pulmonary ROS Sleep apnea CPAP,        GI/Hepatic  Negative GI/hepatic ROS          Negative  ROS        Endo/Other  Negative endo/other ROS Diabetes well controlled type 2 ,   Obesity    GYN  Negative gynecology ROS          Hematology  Negative hematology ROS      Musculoskeletal  Negative musculoskeletal ROS Rheumatoid arthritis Severity: mild, Gout,   Arthritis     Neurology  Negative neurology ROS      Psychology   Negative psychology ROS              Physical Exam    Airway    Mallampati score: II  TM Distance: >3 FB  Neck ROM: full     Dental   No notable dental hx     Cardiovascular  Comment: Negative ROS, Rhythm: regular, Rate: normal, Cardiovascular exam normal    Pulmonary  Pulmonary exam normal Breath sounds clear to auscultation,     Other Findings        Anesthesia Plan  ASA Score- 2     Anesthesia Type- general with ASA Monitors  Additional Monitors:   Airway Plan: LMA  Plan Factors-    Induction- intravenous  Postoperative Plan-     Informed Consent- Anesthetic plan and risks discussed with patient and spouse  I personally reviewed this patient with the CRNA  Discussed and agreed on the Anesthesia Plan with the CRNA  Poli Layne

## 2019-08-09 NOTE — DISCHARGE SUMMARY
[facility] Discharge Note  Laurie Park, 61 y o  male  MRN: [id number]      Preoperative diagnosis: right knee medial meniscus tear, DJD    Postoperative diagnosis: right knee medial meniscus tear, DJD, anteromedial plica     Procedure: right knee arthroscopy with partial medial meniscectomy and anteromedial plica resection    After procedure, patient was brought to PACU  Pain was controlled with IV and oral pain medication  Patient was discharged to home after cleared by anesthesia and when criteria was met  Condition: good    Discharge medications:   See after visit summary for reconciled discharge medications provided to patient and family  Please refer to discharge instructions for further information

## 2019-08-12 ENCOUNTER — TELEPHONE (OUTPATIENT)
Dept: OBGYN CLINIC | Facility: HOSPITAL | Age: 59
End: 2019-08-12

## 2019-08-12 NOTE — TELEPHONE ENCOUNTER
Dr Sara Ramos    Requesting the OP note from surgery on 8/9/19 with Dr Sara Ramos  I was not sure what needs to be sent  It shows up in my fax as links      Please fax to [phone]

## 2019-08-13 NOTE — TELEPHONE ENCOUNTER
Blair Nava I dont know what I need to send to [facility]  They are requesting the OP report but all I have are all kinds of links    Pre-Op, Intra-Op, recovery, Dc, etc  Do we send all or just one link

## 2019-08-13 NOTE — TELEPHONE ENCOUNTER
Operative note is what they need  Unfortunately our printer needs a cartridge, can you try printing it out and faxing  Thanks

## 2019-08-16 ENCOUNTER — OFFICE VISIT (OUTPATIENT)
Dept: OBGYN CLINIC | Facility: CLINIC | Age: 59
End: 2019-08-16

## 2019-08-16 VITALS
DIASTOLIC BLOOD PRESSURE: 80 MMHG | HEART RATE: 82 BPM | HEIGHT: 71 IN | WEIGHT: 275 LBS | SYSTOLIC BLOOD PRESSURE: 120 MMHG | BODY MASS INDEX: 38.5 KG/M2

## 2019-08-16 DIAGNOSIS — Z47.89 AFTERCARE FOLLOWING SURGERY OF THE MUSCULOSKELETAL SYSTEM: Primary | ICD-10-CM

## 2019-08-16 PROBLEM — S83.241A TEAR OF MEDIAL MENISCUS OF RIGHT KNEE, CURRENT: Status: RESOLVED | Noted: 2019-07-18 | Resolved: 2019-08-16

## 2019-08-16 PROCEDURE — 99024 POSTOP FOLLOW-UP VISIT: CPT | Performed by: ORTHOPAEDIC SURGERY

## 2019-08-16 NOTE — ASSESSMENT & PLAN NOTE
Findings consistent with status post left knee arthroscopy partial medial meniscectomy  Discussed findings and treatment options with the patient  I review intraop photos with patient and his wife  I recommend patient to do low-impact exercises with stationary bike  We will continue to limit patient's work activities  I will see patient back in 4 weeks for re-evaluation  All patient's questions were answered to their satisfaction  This note is created using dictation transcription  It may contain typographical errors, grammatical errors, improperly dictated words, background noise and other errors

## 2019-08-16 NOTE — PROGRESS NOTES
Assessment:     1  Aftercare following surgery of the musculoskeletal system        Plan:     Problem List Items Addressed This Visit        Other    Aftercare following surgery of the musculoskeletal system - Primary     Findings consistent with status post left knee arthroscopy partial medial meniscectomy  Discussed findings and treatment options with the patient  I review intraop photos with patient and his wife  I recommend patient to do low-impact exercises with stationary bike  We will continue to limit patient's work activities  I will see patient back in 4 weeks for re-evaluation  All patient's questions were answered to their satisfaction  This note is created using dictation transcription  It may contain typographical errors, grammatical errors, improperly dictated words, background noise and other errors  Subjective:     Patient ID: Marifer Benavides is a 61 y o  male  Chief Complaint:  68-year-old gentleman status post right knee arthroscopy partial medial meniscectomy on 8/9/2019  Patient is doing very well  He has minimal pain  He is ambulating without use of any assistive device  Still some swelling around the right knee  He denies fever, chills, or sweats  He has been doing home exercises with knee motion      Allergy:  No Known Allergies  Medications:  all current active meds have been reviewed  Past Medical History:  Past Medical History:   Diagnosis Date    Arthritis     CPAP (continuous positive airway pressure) dependence     Diabetes mellitus (HCC)     Gout     Hypertension     PONV (postoperative nausea and vomiting)     Sleep apnea      Past Surgical History:  Past Surgical History:   Procedure Laterality Date    HAND SURGERY Left 02/2019    KNEE ARTHROSCOPY Right     x 2    [place] KNEE SCOPE,MED/LAT MENISECTOMY Right 8/9/2019    Procedure: ARTHROSCOPY KNEE, RIGHT PARTIAL MEDIAL MENISCECTOMY,RESECTION ANTEROMEDIAL PLICA;  Surgeon: Modesto Rivas MD;  Location:  MAIN OR;  Service: Orthopedics     Family History:  Family History   Problem Relation Age of Onset    Diabetes Father     Heart disease Father     Hypertension Father      Social History:  Social History     Substance and Sexual Activity   Alcohol Use Yes    Frequency: 2-3 times a week    Drinks per session: 1 or 2     Social History     Substance and Sexual Activity   Drug Use Never     Social History     Tobacco Use   Smoking Status Former Smoker    Packs/day: 2 00    Years: 25 00    Pack years: 50 00    Last attempt to quit:     Years since quittin 6   Smokeless Tobacco Never Used     Review of Systems   Constitutional: Negative  HENT: Negative  Eyes: Negative  Respiratory: Negative  Cardiovascular: Negative  Gastrointestinal: Negative  Endocrine: Negative  Genitourinary: Negative  Musculoskeletal: Positive for arthralgias (Right knee) and joint swelling (Right knee)  Skin: Negative  Neurological: Negative  Hematological: Negative  Psychiatric/Behavioral: Negative  Objective:  BP Readings from Last 1 Encounters:   19 120/80      Wt Readings from Last 1 Encounters:   19 125 kg (275 lb)      BMI:   Estimated body mass index is 38 35 kg/m² as calculated from the following:    Height as of this encounter: 5' 11" (1 803 m)  Weight as of this encounter: 125 kg (275 lb)  BSA:   Estimated body surface area is 2 42 meters squared as calculated from the following:    Height as of this encounter: 5' 11" (1 803 m)  Weight as of this encounter: 125 kg (275 lb)  Physical Exam   Constitutional: He is oriented to person, place, and time  He appears well-developed  HENT:   Head: Normocephalic and atraumatic  Eyes: Conjunctivae and EOM are normal    Neck: Neck supple  Pulmonary/Chest: Effort normal    Musculoskeletal:        Right knee: He exhibits effusion (Trace)  Neurological: He is alert and oriented to person, place, and time     Skin: Skin is warm  Psychiatric: He has a normal mood and affect  Nursing note and vitals reviewed  Right Knee Exam     Tenderness   The patient is experiencing no tenderness  Range of Motion   Extension: normal   Flexion: 120 (Stiffness anteriorly)     Tests   Missael:  Medial - negative Lateral - negative  Varus: negative Valgus: negative  Patellar apprehension: negative    Other   Erythema: absent  Scars: present (Incisions are intact and healing    No signs of infection )  Sensation: normal  Pulse: present  Swelling: mild  Effusion: effusion (Trace) present            No new images for review     Sutures removed

## 2019-08-19 ENCOUNTER — TELEPHONE (OUTPATIENT)
Dept: OBGYN CLINIC | Facility: HOSPITAL | Age: 59
End: 2019-08-19

## 2019-08-19 NOTE — TELEPHONE ENCOUNTER
Raghavendra Dailey, [address] work comp, called to have the last office note faxed to [phone]   Note faxed

## 2019-09-11 ENCOUNTER — OFFICE VISIT (OUTPATIENT)
Dept: OBGYN CLINIC | Facility: CLINIC | Age: 59
End: 2019-09-11
Payer: OTHER MISCELLANEOUS

## 2019-09-11 VITALS
SYSTOLIC BLOOD PRESSURE: 130 MMHG | HEART RATE: 82 BPM | DIASTOLIC BLOOD PRESSURE: 80 MMHG | BODY MASS INDEX: 39.2 KG/M2 | HEIGHT: 71 IN | WEIGHT: 280 LBS

## 2019-09-11 DIAGNOSIS — M25.461 EFFUSION OF RIGHT KNEE: ICD-10-CM

## 2019-09-11 DIAGNOSIS — Z47.89 AFTERCARE FOLLOWING SURGERY OF THE MUSCULOSKELETAL SYSTEM: Primary | ICD-10-CM

## 2019-09-11 DIAGNOSIS — M17.11 PRIMARY OSTEOARTHRITIS OF RIGHT KNEE: ICD-10-CM

## 2019-09-11 PROCEDURE — 20610 DRAIN/INJ JOINT/BURSA W/O US: CPT | Performed by: PHYSICIAN ASSISTANT

## 2019-09-11 PROCEDURE — 99024 POSTOP FOLLOW-UP VISIT: CPT | Performed by: ORTHOPAEDIC SURGERY

## 2019-09-11 RX ORDER — LIDOCAINE HYDROCHLORIDE 10 MG/ML
7 INJECTION, SOLUTION INFILTRATION; PERINEURAL
Status: COMPLETED | OUTPATIENT
Start: 2019-09-11 | End: 2019-09-11

## 2019-09-11 RX ORDER — BETAMETHASONE SODIUM PHOSPHATE AND BETAMETHASONE ACETATE 3; 3 MG/ML; MG/ML
6 INJECTION, SUSPENSION INTRA-ARTICULAR; INTRALESIONAL; INTRAMUSCULAR; SOFT TISSUE
Status: COMPLETED | OUTPATIENT
Start: 2019-09-11 | End: 2019-09-11

## 2019-09-11 RX ADMIN — BETAMETHASONE SODIUM PHOSPHATE AND BETAMETHASONE ACETATE 6 MG: 3; 3 INJECTION, SUSPENSION INTRA-ARTICULAR; INTRALESIONAL; INTRAMUSCULAR; SOFT TISSUE at 15:00

## 2019-09-11 RX ADMIN — LIDOCAINE HYDROCHLORIDE 7 ML: 10 INJECTION, SOLUTION INFILTRATION; PERINEURAL at 15:00

## 2019-09-11 NOTE — PROGRESS NOTES
Assessment:     1  Aftercare following surgery of the musculoskeletal system    2  Effusion of right knee    3  Primary osteoarthritis of right knee        Plan:     Problem List Items Addressed This Visit        Musculoskeletal and Integument    Primary osteoarthritis of right knee    Relevant Medications    lidocaine (XYLOCAINE) 1 % injection 7 mL (Completed)    betamethasone acetate-betamethasone sodium phosphate (CELESTONE) injection 6 mg (Completed)    Other Relevant Orders    Large joint arthrocentesis: R knee (Completed)    Effusion of right knee    Relevant Medications    lidocaine (XYLOCAINE) 1 % injection 7 mL (Completed)    betamethasone acetate-betamethasone sodium phosphate (CELESTONE) injection 6 mg (Completed)    Other Relevant Orders    Large joint arthrocentesis: R knee (Completed)       Other    Aftercare following surgery of the musculoskeletal system - Primary          The patient was seen and examined by Dr Adamaris Singh and myself  Patient is status post right knee arthroscopy with right knee effusion and osteoarthritis  Findings and treatment options were discussed with the patient  Discussed that the osteoarthritis in his knee is aggravated causing an effusion  Recommend aspiration and cortisone injection to the right knee joint today  He tolerated the procedure well  Advised to apply cold compress today  Continue home exercise program for quadriceps strengthening  Continue work restrictions  Follow-up in 4 weeks for re-evaluation  All questions were answered to patient's satisfaction  Subjective:     Patient ID: Sherren Duck is a 61 y o  male  Chief Complaint:  51-year-old gentleman status post right knee arthroscopy partial medial meniscectomy on 8/9/2019  He is doing well overall  He is working with restrictions  He does complain of swelling of his right knee and some pain over the anterior aspect of his knee with stair climbing    The sharp pain he had prior to surgery has resolved  Allergy:  No Known Allergies  Medications:  all current active meds have been reviewed  Past Medical History:  Past Medical History:   Diagnosis Date    Arthritis     CPAP (continuous positive airway pressure) dependence     Diabetes mellitus ([address] )     Gout     Hypertension     PONV (postoperative nausea and vomiting)     Sleep apnea      Past Surgical History:  Past Surgical History:   Procedure Laterality Date    HAND SURGERY Left 2019    KNEE ARTHROSCOPY Right     x 2    [place] KNEE SCOPE,MED/LAT MENISECTOMY Right 2019    Procedure: ARTHROSCOPY KNEE, RIGHT PARTIAL MEDIAL MENISCECTOMY,RESECTION ANTEROMEDIAL PLICA;  Surgeon: Modesto Rivas MD;  Location: [facility] OR;  Service: Orthopedics     Family History:  Family History   Problem Relation Age of Onset    Diabetes Father     Heart disease Father     Hypertension Father      Social History:  Social History     Substance and Sexual Activity   Alcohol Use Yes    Frequency: 2-3 times a week    Drinks per session: 1 or 2     Social History     Substance and Sexual Activity   Drug Use Never     Social History     Tobacco Use   Smoking Status Former Smoker    Packs/day: 2 00    Years: 25 00    Pack years: 50 00    Last attempt to quit:     Years since quittin 7   Smokeless Tobacco Never Used     Review of Systems   Constitutional: Negative  HENT: Negative  Eyes: Negative  Respiratory: Negative  Cardiovascular: Negative  Gastrointestinal: Negative  Endocrine: Negative  Genitourinary: Negative  Musculoskeletal: Positive for arthralgias (Right knee) and joint swelling (Right knee)  Skin: Negative  Neurological: Negative  Hematological: Negative  Psychiatric/Behavioral: Negative            Objective:  BP Readings from Last 1 Encounters:   19 130/80      Wt Readings from Last 1 Encounters:   19 127 kg (280 lb)      BMI:   Estimated body mass index is 39 05 kg/m² as calculated from the following:    Height as of this encounter: 5' 11" (1 803 m)  Weight as of this encounter: 127 kg (280 lb)  BSA:   Estimated body surface area is 2 43 meters squared as calculated from the following:    Height as of this encounter: 5' 11" (1 803 m)  Weight as of this encounter: 127 kg (280 lb)  Physical Exam   Constitutional: He is oriented to person, place, and time  He appears well-developed  HENT:   Head: Normocephalic and atraumatic  Eyes: Conjunctivae and EOM are normal    Neck: Neck supple  Pulmonary/Chest: Effort normal    Musculoskeletal:        Right knee: He exhibits effusion (Grade 2)  Neurological: He is alert and oriented to person, place, and time  Skin: Skin is warm  Psychiatric: He has a normal mood and affect  Nursing note and vitals reviewed  Right Knee Exam     Tenderness   The patient is experiencing no tenderness  Range of Motion   Extension: normal   Flexion: 120 (Stiffness anteriorly)     Tests   Missael:  Medial - negative Lateral - negative  Varus: negative Valgus: negative  Patellar apprehension: negative    Other   Erythema: absent  Scars: present (Incisions are intact and healed    No signs of infection )  Sensation: normal  Pulse: present  Swelling: mild  Effusion: effusion (Grade 2) present            No new images for review     Large joint arthrocentesis: R knee  Date/Time: 9/11/2019 3:00 PM  Consent given by: patient  Site marked: site marked  Timeout: Immediately prior to procedure a time out was called to verify the correct patient, procedure, equipment, support staff and site/side marked as required   Supporting Documentation  Indications: pain and joint swelling   Procedure Details  Location: knee - R knee  Needle size: 18 G (25 gauge for anesthetic)  Approach: superior  Medications administered: 7 mL lidocaine 1 %; 6 mg betamethasone acetate-betamethasone sodium phosphate 6 (3-3) mg/mL    Aspirate amount: 18 mL  Aspirate: clear and yellow    Patient tolerance: patient tolerated the procedure well with no immediate complications  Dressing:  Sterile dressing applied    5 cc 1% lidocaine used for anesthetic

## 2019-09-17 ENCOUNTER — TELEPHONE (OUTPATIENT)
Dept: OBGYN CLINIC | Facility: HOSPITAL | Age: 59
End: 2019-09-17

## 2019-10-09 ENCOUNTER — OFFICE VISIT (OUTPATIENT)
Dept: OBGYN CLINIC | Facility: CLINIC | Age: 59
End: 2019-10-09

## 2019-10-09 VITALS
SYSTOLIC BLOOD PRESSURE: 132 MMHG | HEART RATE: 82 BPM | HEIGHT: 71 IN | WEIGHT: 270 LBS | DIASTOLIC BLOOD PRESSURE: 80 MMHG | BODY MASS INDEX: 37.8 KG/M2

## 2019-10-09 DIAGNOSIS — M17.11 PRIMARY OSTEOARTHRITIS OF RIGHT KNEE: ICD-10-CM

## 2019-10-09 DIAGNOSIS — Z47.89 AFTERCARE FOLLOWING SURGERY OF THE MUSCULOSKELETAL SYSTEM: Primary | ICD-10-CM

## 2019-10-09 PROBLEM — M25.461 EFFUSION OF RIGHT KNEE: Status: RESOLVED | Noted: 2019-06-18 | Resolved: 2019-10-09

## 2019-10-09 PROCEDURE — 99024 POSTOP FOLLOW-UP VISIT: CPT | Performed by: ORTHOPAEDIC SURGERY

## 2019-10-09 RX ORDER — ASPIRIN 81 MG/1
81 TABLET ORAL DAILY
COMMUNITY
End: 2022-04-20

## 2019-10-09 RX ORDER — PITAVASTATIN CALCIUM 2.09 MG/1
4 TABLET, FILM COATED ORAL DAILY
Refills: 5 | COMMUNITY
Start: 2019-09-16

## 2019-10-09 RX ORDER — TOCILIZUMAB 180 MG/ML
INJECTION, SOLUTION SUBCUTANEOUS
COMMUNITY
Start: 2019-10-03 | End: 2022-04-20

## 2019-10-09 NOTE — PROGRESS NOTES
Assessment:     1  Aftercare following surgery of the musculoskeletal system    2  Primary osteoarthritis of right knee        Plan:     Problem List Items Addressed This Visit        Musculoskeletal and Integument    Primary osteoarthritis of right knee       Other    Aftercare following surgery of the musculoskeletal system - Primary          Findings consistent with status post right knee arthroscopy partial medial meniscectomy  Discussed findings and treatment options with the patient  Patient is doing well  I advised patient to continue low-impact exercises and over-the-counter NSAID as needed  We will allow patient to return to work without restriction  I advised patient to avoid repetitive kneeling, squatting, climbing, and high impact activities  Patient understands that he does have arthritis in his knee and may give him problem in the future  I will see patient back as needed  All patient's questions were answered to his satisfaction  This note is created using dictation transcription  It may contain typographical errors, grammatical errors, improperly dictated words, background noise and other errors  Subjective:     Patient ID: Jazz Roman is a 61 y o  male  Chief Complaint:  22-year-old gentleman status post right knee arthroscopy and partial medial meniscectomy done 8/9/2019  Patient's knee is feeling much better  The cortisone injection did provide him with pain relief  He still has occasional discomfort in the right knee but compared to before surgery the knee is feeling much better  He denies locking or giving way sensations  Patient feels that he is ready to return to work without restrictions      Allergy:  No Known Allergies  Medications:  all current active meds have been reviewed  Past Medical History:  Past Medical History:   Diagnosis Date    Arthritis     CPAP (continuous positive airway pressure) dependence     [facility] mellitus ([facility] [address]     Gout     Hypertension     PONV (postoperative nausea and vomiting)     Sleep apnea      Past Surgical History:  Past Surgical History:   Procedure Laterality Date    HAND SURGERY Left 2019    KNEE ARTHROSCOPY Right     x 2    [place] KNEE SCOPE,MED/LAT MENISECTOMY Right 2019    Procedure: ARTHROSCOPY KNEE, RIGHT PARTIAL MEDIAL MENISCECTOMY,RESECTION ANTEROMEDIAL PLICA;  Surgeon: Ginger Ruiz MD;  Location: [facility] OR;  Service: Orthopedics     Family History:  Family History   Problem Relation Age of Onset    Diabetes Father     Heart disease Father     Hypertension Father      Social History:  Social History     Substance and Sexual Activity   Alcohol Use Yes    Frequency: 2-3 times a week    Drinks per session: 1 or 2     Social History     Substance and Sexual Activity   Drug Use Never     Social History     Tobacco Use   Smoking Status Former Smoker    Packs/day: 2 00    Years: 25 00    Pack years: 50 00    Last attempt to quit:     Years since quittin 7   Smokeless Tobacco Never Used     Review of Systems   Constitutional: Negative  HENT: Negative  Eyes: Negative  Respiratory: Negative  Cardiovascular: Negative  Gastrointestinal: Negative  Endocrine: Negative  Genitourinary: Negative  Musculoskeletal: Negative for arthralgias, gait problem and joint swelling  Skin: Negative  Neurological: Negative  Hematological: Negative  Psychiatric/Behavioral: Negative  Objective:  BP Readings from Last 1 Encounters:   10/09/19 132/80      Wt Readings from Last 1 Encounters:   10/09/19 122 kg (270 lb)      BMI:   Estimated body mass index is 37 66 kg/m² as calculated from the following:    Height as of this encounter: 5' 11" (1 803 m)  Weight as of this encounter: 122 kg (270 lb)  BSA:   Estimated body surface area is 2 39 meters squared as calculated from the following:    Height as of this encounter: 5' 11" (1 803 m)  Weight as of this encounter: 122 kg (270 lb)  Physical Exam   Constitutional: He is oriented to person, place, and time  He appears well-developed  HENT:   Head: Normocephalic and atraumatic  Eyes: Conjunctivae and EOM are normal    Neck: Neck supple  Pulmonary/Chest: Effort normal    Musculoskeletal:        Right knee: He exhibits no effusion (Trace)  Neurological: He is alert and oriented to person, place, and time  Skin: Skin is warm  Psychiatric: He has a normal mood and affect  Nursing note and vitals reviewed  Right Knee Exam     Muscle Strength   The patient has normal right knee strength  Tenderness   The patient is experiencing no tenderness  Range of Motion   The patient has normal right knee ROM      Tests   Missael:  Medial - negative Lateral - negative  Varus: negative Valgus: negative  Patellar apprehension: negative    Other   Erythema: absent  Sensation: normal  Pulse: present  Swelling: mild  Effusion: no effusion (Trace) present            No new images for review

## 2019-10-10 ENCOUNTER — TELEPHONE (OUTPATIENT)
Dept: OBGYN CLINIC | Facility: HOSPITAL | Age: 59
End: 2019-10-10

## 2019-10-10 NOTE — TELEPHONE ENCOUNTER
Julián Olmstead from [employer] is calling to request work status note    Please fax to [phone]    If questions, please call Pearl Newell at [employer]   [phone]

## 2021-08-18 ENCOUNTER — OFFICE VISIT (OUTPATIENT)
Dept: OBGYN CLINIC | Facility: CLINIC | Age: 61
End: 2021-08-18
Payer: COMMERCIAL

## 2021-08-18 ENCOUNTER — APPOINTMENT (OUTPATIENT)
Dept: RADIOLOGY | Facility: CLINIC | Age: 61
End: 2021-08-18
Payer: COMMERCIAL

## 2021-08-18 VITALS
BODY MASS INDEX: 39.2 KG/M2 | HEIGHT: 71 IN | SYSTOLIC BLOOD PRESSURE: 124 MMHG | WEIGHT: 280 LBS | DIASTOLIC BLOOD PRESSURE: 80 MMHG

## 2021-08-18 DIAGNOSIS — M17.11 PRIMARY OSTEOARTHRITIS OF RIGHT KNEE: Primary | ICD-10-CM

## 2021-08-18 DIAGNOSIS — M25.561 RIGHT KNEE PAIN, UNSPECIFIED CHRONICITY: ICD-10-CM

## 2021-08-18 PROCEDURE — 73564 X-RAY EXAM KNEE 4 OR MORE: CPT

## 2021-08-18 PROCEDURE — 99213 OFFICE O/P EST LOW 20 MIN: CPT | Performed by: ORTHOPAEDIC SURGERY

## 2021-08-18 RX ORDER — GABAPENTIN 600 MG/1
900 TABLET ORAL 2 TIMES DAILY
COMMUNITY
Start: 2021-07-03

## 2021-08-18 RX ORDER — LEVETIRACETAM 1000 MG/1
1500 TABLET ORAL 2 TIMES DAILY
COMMUNITY
Start: 2021-06-28

## 2021-08-18 RX ORDER — TOFACITINIB 11 MG/1
11 TABLET, FILM COATED, EXTENDED RELEASE ORAL DAILY
COMMUNITY
Start: 2021-07-07

## 2021-08-18 NOTE — ASSESSMENT & PLAN NOTE
Findings consistent with  Right knee osteoarthritis  Discussed findings and treatment options with the patient  I reviewed patient's right knee x-ray with him and compared to x-ray done in 2019  Patient appeared to have progressive arthritic changes in his right knee  Patient would like to continue conservative treatment  Since patient had cortisone injection done in the past and may have cause him to have seizures, I recommend joint supplement injection  Patient should continue to use the knee brace for support  He should take over-the-counter NSAID and use of topical NSAID cream   I also encouraged patient to do low-impact exercises with stationary bike or swimming  I advised patient to avoid repetitive squatting, kneeling, and climbing, which may be difficult due to his job  We will contact patient once the joint supplement injection is approved by his insurance company  All patient's questions were answered to his satisfaction  This note is created using dictation transcription  It may contain typographical errors, grammatical errors, improperly dictated words, background noise and other errors

## 2021-08-18 NOTE — PROGRESS NOTES
Assessment:     1  Primary osteoarthritis of right knee    2  Right knee pain, unspecified chronicity        Plan:     Problem List Items Addressed This Visit        Musculoskeletal and Integument    Primary osteoarthritis of right knee - Primary       Findings consistent with  Right knee osteoarthritis  Discussed findings and treatment options with the patient  I reviewed patient's right knee x-ray with him and compared to x-ray done in 2019  Patient appeared to have progressive arthritic changes in his right knee  Patient would like to continue conservative treatment  Since patient had cortisone injection done in the past and may have cause him to have seizures, I recommend joint supplement injection  Patient should continue to use the knee brace for support  He should take over-the-counter NSAID and use of topical NSAID cream   I also encouraged patient to do low-impact exercises with stationary bike or swimming  I advised patient to avoid repetitive squatting, kneeling, and climbing, which may be difficult due to his job  We will contact patient once the joint supplement injection is approved by his insurance company  All patient's questions were answered to his satisfaction  This note is created using dictation transcription  It may contain typographical errors, grammatical errors, improperly dictated words, background noise and other errors  Relevant Orders    Injection procedure prior authorization      Other Visit Diagnoses     Right knee pain, unspecified chronicity        Relevant Orders    XR knee 4+ vw right injury         Subjective:     Patient ID: Eryn Mcdaniel is a 64 y o  male  Chief Complaint:   70-year-old male with gradual onset of right knee pain for the past a month  Patient had history of right knee arthroscopy with partial medial meniscectomy  He was also diagnosed with osteoarthritis in the right knee  He denies injury recently    He also have been seen a rheumatologist and getting cortisone injections in the right knee  The injection would only last for a few weeks  Patient also developed seizure after his knee injections with steroid  He also had Synvisc injection but cause him to have swelling in the right knee  He is complaining of pain with it prolonged walking and standing  He denies locking or giving way sensations  He feels the knee is stiff and sometimes feel is swollen  Allergy:  No Known Allergies  Medications:  all current active meds have been reviewed  Past Medical History:  Past Medical History:   Diagnosis Date    Arthritis     CPAP (continuous positive airway pressure) dependence     Diabetes mellitus (HCC)     Gout     Hypertension     PONV (postoperative nausea and vomiting)     Seizures (HCC)     Sleep apnea      Past Surgical History:  Past Surgical History:   Procedure Laterality Date    HAND SURGERY Left 2019    KNEE ARTHROSCOPY Right     x 2    [place] KNEE SCOPE,MED/LAT MENISECTOMY Right 2019    Procedure: ARTHROSCOPY KNEE, RIGHT PARTIAL MEDIAL MENISCECTOMY,RESECTION ANTEROMEDIAL PLICA;  Surgeon: Birgit Callejas MD;  Location: [facility] OR;  Service: Orthopedics     Family History:  Family History   Problem Relation Age of Onset    Diabetes Father     Heart disease Father     Hypertension Father      Social History:  Social History     Substance and Sexual Activity   Alcohol Use Yes     Social History     Substance and Sexual Activity   Drug Use Never     Social History     Tobacco Use   Smoking Status Former Smoker    Packs/day: 2 00    Years: 25 00    Pack years: 50 00    Quit date:     Years since quittin 6   Smokeless Tobacco Never Used     Review of Systems   Constitutional: Negative  HENT: Negative  Eyes: Negative  Respiratory: Negative  Cardiovascular: Negative  Gastrointestinal: Negative  Endocrine: Negative  Genitourinary: Negative      Musculoskeletal: Positive for arthralgias (Right knee), gait problem (Antalgic) and joint swelling (Right knee)  Skin: Negative  Hematological: Negative  Psychiatric/Behavioral: Negative  Objective:  BP Readings from Last 1 Encounters:   08/18/21 124/80      Wt Readings from Last 1 Encounters:   08/18/21 127 kg (280 lb)      BMI:   Estimated body mass index is 39 05 kg/m² as calculated from the following:    Height as of this encounter: 5' 11" (1 803 m)  Weight as of this encounter: 127 kg (280 lb)  BSA:   Estimated body surface area is 2 43 meters squared as calculated from the following:    Height as of this encounter: 5' 11" (1 803 m)  Weight as of this encounter: 127 kg (280 lb)  Physical Exam  Vitals and nursing note reviewed  Constitutional:       Appearance: Normal appearance  He is well-developed  HENT:      Head: Normocephalic and atraumatic  Right Ear: External ear normal       Left Ear: External ear normal    Eyes:      Extraocular Movements: Extraocular movements intact  Conjunctiva/sclera: Conjunctivae normal    Pulmonary:      Effort: Pulmonary effort is normal    Musculoskeletal:      Cervical back: Neck supple  Right knee: Effusion (Grade 1) present  Instability Tests: Medial Missael test negative and lateral Missael test negative  Skin:     General: Skin is warm  Neurological:      Mental Status: He is alert and oriented to person, place, and time  Psychiatric:         Mood and Affect: Mood normal          Behavior: Behavior normal        Right Knee Exam     Muscle Strength   The patient has normal right knee strength  Tenderness   The patient is experiencing no tenderness  Range of Motion   The patient has normal right knee ROM      Tests   Missael:  Medial - negative Lateral - negative  Varus: negative Valgus: negative  Patellar apprehension: negative    Other   Erythema: absent  Sensation: normal  Pulse: present  Swelling: mild  Effusion: effusion (Grade 1) present    Comments: Patellofemoral joint crepitation with knee motion   varus alignment            I have personally reviewed pertinent films in PACS and my interpretation is Right knee x-ray show advanced osteoarthritis with medial joint narrowing and marginal osteophyte  Varus alignment

## 2021-09-07 ENCOUNTER — PROCEDURE VISIT (OUTPATIENT)
Dept: OBGYN CLINIC | Facility: CLINIC | Age: 61
End: 2021-09-07
Payer: COMMERCIAL

## 2021-09-07 VITALS
BODY MASS INDEX: 39.48 KG/M2 | HEIGHT: 71 IN | WEIGHT: 282 LBS | DIASTOLIC BLOOD PRESSURE: 70 MMHG | SYSTOLIC BLOOD PRESSURE: 116 MMHG

## 2021-09-07 DIAGNOSIS — M25.461 EFFUSION OF RIGHT KNEE: ICD-10-CM

## 2021-09-07 DIAGNOSIS — M17.11 PRIMARY OSTEOARTHRITIS OF RIGHT KNEE: Primary | ICD-10-CM

## 2021-09-07 PROCEDURE — 99213 OFFICE O/P EST LOW 20 MIN: CPT | Performed by: PHYSICIAN ASSISTANT

## 2021-09-07 PROCEDURE — 20610 DRAIN/INJ JOINT/BURSA W/O US: CPT | Performed by: PHYSICIAN ASSISTANT

## 2021-09-07 NOTE — PROGRESS NOTES
Assessment:     1  Primary osteoarthritis of right knee    2  Effusion of right knee        Plan:     Problem List Items Addressed This Visit        Musculoskeletal and Integument    Primary osteoarthritis of right knee - Primary    Relevant Medications    sodium hyaluronate (ORTHOVISC) injection SOSY 30 mg (Completed)    Other Relevant Orders    Large joint arthrocentesis: R knee (Completed)    Effusion of right knee    Relevant Medications    sodium hyaluronate (ORTHOVISC) injection SOSY 30 mg (Completed)    Other Relevant Orders    Large joint arthrocentesis: R knee (Completed)            Findings consistent with right knee osteoarthritis and effusion  Findings and treatment options were discussed with the patient  The right knee was aspirated and the 1st of 3 Orthovisc injections was given today  He tolerated the procedure well  Advised to apply cold compress today  Follow-up in 1 week for the 2nd injection  All questions were answered to patient's satisfaction  Subjective:     Patient ID: Chhaya Patel is a 64 y o  male  Chief Complaint:   70-year-old male   Following up for right knee osteoarthritis and effusion  He is here to begin his series of Orthovisc injections  He continues to feel swelling in his knee        Allergy:  No Known Allergies  Medications:  all current active meds have been reviewed  Past Medical History:  Past Medical History:   Diagnosis Date    Arthritis     CPAP (continuous positive airway pressure) dependence     Diabetes mellitus (HCC)     Gout     Hypertension     PONV (postoperative nausea and vomiting)     Seizures (HCC)     Sleep apnea      Past Surgical History:  Past Surgical History:   Procedure Laterality Date    HAND SURGERY Left 02/2019    KNEE ARTHROSCOPY Right     x 2    [place] KNEE SCOPE,MED/LAT MENISECTOMY Right 8/9/2019    Procedure: ARTHROSCOPY KNEE, RIGHT PARTIAL MEDIAL MENISCECTOMY,RESECTION ANTEROMEDIAL PLICA;  Surgeon: Thaddeus Carrero MD;  Location: [facility];  Service: Orthopedics     Family History:  Family History   Problem Relation Age of Onset    Diabetes Father     Heart disease Father     Hypertension Father      Social History:  Social History     Substance and Sexual Activity   Alcohol Use Yes     Social History     Substance and Sexual Activity   Drug Use Never     Social History     Tobacco Use   Smoking Status Former Smoker    Packs/day: 2 00    Years: 25 00    Pack years: 50 00    Quit date:     Years since quittin 6   Smokeless Tobacco Never Used     Review of Systems   Constitutional: Negative  HENT: Negative  Eyes: Negative  Respiratory: Negative  Cardiovascular: Negative  Gastrointestinal: Negative  Endocrine: Negative  Genitourinary: Negative  Musculoskeletal: Positive for arthralgias (Right knee), gait problem (Antalgic) and joint swelling (Right knee)  Skin: Negative  Hematological: Negative  Psychiatric/Behavioral: Negative  Objective:  BP Readings from Last 1 Encounters:   21 116/70      Wt Readings from Last 1 Encounters:   21 128 kg (282 lb)      BMI:   Estimated body mass index is 39 33 kg/m² as calculated from the following:    Height as of this encounter: 5' 11" (1 803 m)  Weight as of this encounter: 128 kg (282 lb)  BSA:   Estimated body surface area is 2 44 meters squared as calculated from the following:    Height as of this encounter: 5' 11" (1 803 m)  Weight as of this encounter: 128 kg (282 lb)  Physical Exam  Vitals and nursing note reviewed  Constitutional:       Appearance: Normal appearance  He is well-developed  HENT:      Head: Normocephalic and atraumatic  Right Ear: External ear normal       Left Ear: External ear normal    Eyes:      Extraocular Movements: Extraocular movements intact        Conjunctiva/sclera: Conjunctivae normal    Pulmonary:      Effort: Pulmonary effort is normal    Musculoskeletal:      Cervical back: Neck supple  Right knee: Effusion (Grade 3) present  Instability Tests: Medial Missael test negative and lateral Missael test negative  Skin:     General: Skin is warm  Neurological:      Mental Status: He is alert and oriented to person, place, and time  Psychiatric:         Mood and Affect: Mood normal          Behavior: Behavior normal        Right Knee Exam     Muscle Strength   The patient has normal right knee strength  Tenderness   The patient is experiencing no tenderness  Range of Motion   The patient has normal right knee ROM  Tests   Missael:  Medial - negative Lateral - negative  Varus: negative Valgus: negative  Patellar apprehension: negative    Other   Erythema: absent  Sensation: normal  Pulse: present  Swelling: mild  Effusion: effusion (Grade 3) present    Comments:    Patellofemoral joint crepitation with knee motion   varus alignment            No new imaging  Large joint arthrocentesis: R knee  Universal Protocol:  Consent: Verbal consent obtained    Risks and benefits: risks, benefits and alternatives were discussed  Consent given by: patient  Patient understanding: patient states understanding of the procedure being performed    Supporting Documentation  Indications: pain and joint swelling   Procedure Details  Location: knee - R knee  Preparation: Patient was prepped and draped in the usual sterile fashion  Needle size: 18 G (25 gauge for anesthetic)  Approach: superior  Medications administered: 30 mg sodium hyaluronate 30 mg/2 mL    Aspirate amount: 30 mL  Aspirate: clear and yellow    Patient tolerance: patient tolerated the procedure well with no immediate complications  Dressing:  Sterile dressing applied      5 cc 1% lidocaine used for anesthetic

## 2021-09-14 ENCOUNTER — PROCEDURE VISIT (OUTPATIENT)
Dept: OBGYN CLINIC | Facility: CLINIC | Age: 61
End: 2021-09-14
Payer: COMMERCIAL

## 2021-09-14 VITALS
DIASTOLIC BLOOD PRESSURE: 80 MMHG | BODY MASS INDEX: 40.18 KG/M2 | WEIGHT: 287 LBS | HEIGHT: 71 IN | SYSTOLIC BLOOD PRESSURE: 120 MMHG

## 2021-09-14 DIAGNOSIS — M17.11 PRIMARY OSTEOARTHRITIS OF RIGHT KNEE: ICD-10-CM

## 2021-09-14 DIAGNOSIS — M25.461 EFFUSION OF RIGHT KNEE: Primary | ICD-10-CM

## 2021-09-14 PROCEDURE — 20610 DRAIN/INJ JOINT/BURSA W/O US: CPT | Performed by: PHYSICIAN ASSISTANT

## 2021-09-14 PROCEDURE — 99213 OFFICE O/P EST LOW 20 MIN: CPT | Performed by: PHYSICIAN ASSISTANT

## 2021-09-14 NOTE — PROGRESS NOTES
Assessment:     1  Effusion of right knee    2  Primary osteoarthritis of right knee        Plan:     Problem List Items Addressed This Visit        Musculoskeletal and Integument    Primary osteoarthritis of right knee    Relevant Medications    sodium hyaluronate (ORTHOVISC) injection SOSY 30 mg (Completed)    Other Relevant Orders    Large joint arthrocentesis: R knee (Completed)    Effusion of right knee - Primary    Relevant Medications    sodium hyaluronate (ORTHOVISC) injection SOSY 30 mg (Completed)    Other Relevant Orders    Large joint arthrocentesis: R knee (Completed)            Findings consistent with right knee osteoarthritis and effusion  Findings and treatment options were discussed with the patient  The right knee was aspirated and the 2nd of 3 Orthovisc injections was given today  He tolerated the procedure well  Advised to apply cold compress today  Follow-up in 1 week for the 3rd injection  All questions were answered to patient's satisfaction  Subjective:     Patient ID: Miroslava Hardin is a 64 y o  male  Chief Complaint:   79-year-old male   Following up for right knee osteoarthritis and effusion  He is here for the 2nd of 3 right knee Orthovisc injections  No issues after the first injection  He does feel some swelling has returned       Allergy:  No Known Allergies  Medications:  all current active meds have been reviewed  Past Medical History:  Past Medical History:   Diagnosis Date    Arthritis     CPAP (continuous positive airway pressure) dependence     Diabetes mellitus (HCC)     Gout     Hypertension     PONV (postoperative nausea and vomiting)     Seizures (HCC)     Sleep apnea      Past Surgical History:  Past Surgical History:   Procedure Laterality Date    HAND SURGERY Left 02/2019    KNEE ARTHROSCOPY Right     x 2    [place] KNEE SCOPE,MED/LAT MENISECTOMY Right 8/9/2019    Procedure: ARTHROSCOPY KNEE, RIGHT PARTIAL MEDIAL MENISCECTOMY,RESECTION ANTEROMEDIAL PLICA; Surgeon: Bernadette Browne MD;  Location: [facility] OR;  Service: Orthopedics     Family History:  Family History   Problem Relation Age of Onset    Diabetes Father     Heart disease Father     Hypertension Father      Social History:  Social History     Substance and Sexual Activity   Alcohol Use Yes     Social History     Substance and Sexual Activity   Drug Use Never     Social History     Tobacco Use   Smoking Status Former Smoker    Packs/day: 2 00    Years: 25 00    Pack years: 50 00    Quit date:     Years since quittin 7   Smokeless Tobacco Never Used     Review of Systems   Constitutional: Negative  HENT: Negative  Eyes: Negative  Respiratory: Negative  Cardiovascular: Negative  Gastrointestinal: Negative  Endocrine: Negative  Genitourinary: Negative  Musculoskeletal: Positive for arthralgias (Right knee), gait problem (Antalgic) and joint swelling (Right knee)  Skin: Negative  Hematological: Negative  Psychiatric/Behavioral: Negative  Objective:  BP Readings from Last 1 Encounters:   21 120/80      Wt Readings from Last 1 Encounters:   21 130 kg (287 lb)      BMI:   Estimated body mass index is 40 03 kg/m² as calculated from the following:    Height as of this encounter: 5' 11" (1 803 m)  Weight as of this encounter: 130 kg (287 lb)  BSA:   Estimated body surface area is 2 46 meters squared as calculated from the following:    Height as of this encounter: 5' 11" (1 803 m)  Weight as of this encounter: 130 kg (287 lb)  Physical Exam  Vitals and nursing note reviewed  Constitutional:       Appearance: Normal appearance  He is well-developed  HENT:      Head: Normocephalic and atraumatic  Right Ear: External ear normal       Left Ear: External ear normal    Eyes:      Extraocular Movements: Extraocular movements intact        Conjunctiva/sclera: Conjunctivae normal    Pulmonary:      Effort: Pulmonary effort is normal  Musculoskeletal:      Cervical back: Neck supple  Right knee: Effusion (Grade 3) present  Instability Tests: Medial Missael test negative and lateral Missael test negative  Skin:     General: Skin is warm  Neurological:      Mental Status: He is alert and oriented to person, place, and time  Psychiatric:         Mood and Affect: Mood normal          Behavior: Behavior normal        Right Knee Exam     Muscle Strength   The patient has normal right knee strength  Tenderness   The patient is experiencing no tenderness  Range of Motion   The patient has normal right knee ROM  Tests   Missael:  Medial - negative Lateral - negative  Varus: negative Valgus: negative  Patellar apprehension: negative    Other   Erythema: absent  Sensation: normal  Pulse: present  Swelling: mild  Effusion: effusion (Grade 3) present    Comments:    Patellofemoral joint crepitation with knee motion   varus alignment            No new imaging  Large joint arthrocentesis: R knee  Universal Protocol:  Consent: Verbal consent obtained    Risks and benefits: risks, benefits and alternatives were discussed  Consent given by: patient  Patient understanding: patient states understanding of the procedure being performed    Supporting Documentation  Indications: pain and joint swelling   Procedure Details  Location: knee - R knee  Preparation: Patient was prepped and draped in the usual sterile fashion  Needle size: 18 G (25 gauge for anesthetic)  Approach: superior  Medications administered: 30 mg sodium hyaluronate 30 mg/2 mL    Aspirate amount: 40 mL  Aspirate: clear and yellow    Patient tolerance: patient tolerated the procedure well with no immediate complications  Dressing:  Sterile dressing applied      5 cc 1 percent lidocaine used for anesthetic

## 2021-09-21 ENCOUNTER — PROCEDURE VISIT (OUTPATIENT)
Dept: OBGYN CLINIC | Facility: CLINIC | Age: 61
End: 2021-09-21
Payer: COMMERCIAL

## 2021-09-21 VITALS
WEIGHT: 281 LBS | DIASTOLIC BLOOD PRESSURE: 82 MMHG | BODY MASS INDEX: 39.34 KG/M2 | HEIGHT: 71 IN | SYSTOLIC BLOOD PRESSURE: 128 MMHG

## 2021-09-21 DIAGNOSIS — M25.461 EFFUSION OF RIGHT KNEE: ICD-10-CM

## 2021-09-21 DIAGNOSIS — M17.11 PRIMARY OSTEOARTHRITIS OF RIGHT KNEE: Primary | ICD-10-CM

## 2021-09-21 PROCEDURE — 99213 OFFICE O/P EST LOW 20 MIN: CPT | Performed by: PHYSICIAN ASSISTANT

## 2021-09-21 PROCEDURE — 20610 DRAIN/INJ JOINT/BURSA W/O US: CPT | Performed by: PHYSICIAN ASSISTANT

## 2021-09-21 NOTE — PROGRESS NOTES
Assessment:     1  Primary osteoarthritis of right knee    2  Effusion of right knee        Plan:     Problem List Items Addressed This Visit        Musculoskeletal and Integument    Primary osteoarthritis of right knee - Primary    Relevant Medications    sodium hyaluronate (ORTHOVISC) injection SOSY 30 mg (Completed)    Other Relevant Orders    Large joint arthrocentesis: R knee (Completed)    Effusion of right knee    Relevant Medications    sodium hyaluronate (ORTHOVISC) injection SOSY 30 mg (Completed)    Other Relevant Orders    Large joint arthrocentesis: R knee (Completed)            Findings consistent with right knee osteoarthritis and effusion  Findings and treatment options were discussed with the patient  The right knee was aspirated and the 3rd of 3 Orthovisc injections was given today  He tolerated the procedure well  Advised to apply cold compress today  Follow up as needed if symptoms return  Advise patient soonest he have another series in 6 months  All questions were answered to patient's satisfaction  Subjective:     Patient ID: Burke Seth is a 64 y o  male  Chief Complaint:   51-year-old male   Following up for right knee osteoarthritis and effusion  He is here for the 3rd of 3 right knee Orthovisc injections  No issues after the second injection  He is starting to feel improvement from the injections      Allergy:  No Known Allergies  Medications:  all current active meds have been reviewed  Past Medical History:  Past Medical History:   Diagnosis Date    Arthritis     CPAP (continuous positive airway pressure) dependence     Diabetes mellitus (HCC)     Gout     Hypertension     PONV (postoperative nausea and vomiting)     Seizures (HCC)     Sleep apnea      Past Surgical History:  Past Surgical History:   Procedure Laterality Date    HAND SURGERY Left 02/2019    KNEE ARTHROSCOPY Right     x 2    CT KNEE SCOPE,MED/LAT MENISECTOMY Right 8/9/2019    Procedure: ARTHROSCOPY KNEE, RIGHT PARTIAL MEDIAL MENISCECTOMY,RESECTION ANTEROMEDIAL PLICA;  Surgeon: Ryan Hardin MD;  Location: [facility];  Service: Orthopedics     Family History:  Family History   Problem Relation Age of Onset    Diabetes Father     Heart disease Father     Hypertension Father      Social History:  Social History     Substance and Sexual Activity   Alcohol Use Yes     Social History     Substance and Sexual Activity   Drug Use Never     Social History     Tobacco Use   Smoking Status Former Smoker    Packs/day: 2 00    Years: 25 00    Pack years: 50 00    Quit date:     Years since quittin 7   Smokeless Tobacco Never Used     Review of Systems   Constitutional: Negative  HENT: Negative  Eyes: Negative  Respiratory: Negative  Cardiovascular: Negative  Gastrointestinal: Negative  Endocrine: Negative  Genitourinary: Negative  Musculoskeletal: Positive for arthralgias (Right knee), gait problem (Antalgic) and joint swelling (Right knee)  Skin: Negative  Hematological: Negative  Psychiatric/Behavioral: Negative  Objective:  BP Readings from Last 1 Encounters:   21 128/82      Wt Readings from Last 1 Encounters:   21 127 kg (281 lb)      BMI:   Estimated body mass index is 39 19 kg/m² as calculated from the following:    Height as of this encounter: 5' 11" (1 803 m)  Weight as of this encounter: 127 kg (281 lb)  BSA:   Estimated body surface area is 2 43 meters squared as calculated from the following:    Height as of this encounter: 5' 11" (1 803 m)  Weight as of this encounter: 127 kg (281 lb)  Physical Exam  Vitals and nursing note reviewed  Constitutional:       Appearance: Normal appearance  He is well-developed  HENT:      Head: Normocephalic and atraumatic  Right Ear: External ear normal       Left Ear: External ear normal    Eyes:      Extraocular Movements: Extraocular movements intact        Conjunctiva/sclera: Conjunctivae normal    Pulmonary:      Effort: Pulmonary effort is normal    Musculoskeletal:      Cervical back: Neck supple  Right knee: Effusion (Grade 2) present  Instability Tests: Medial Missael test negative and lateral Missael test negative  Skin:     General: Skin is warm  Neurological:      Mental Status: He is alert and oriented to person, place, and time  Psychiatric:         Mood and Affect: Mood normal          Behavior: Behavior normal        Right Knee Exam     Muscle Strength   The patient has normal right knee strength  Tenderness   The patient is experiencing no tenderness  Range of Motion   The patient has normal right knee ROM  Tests   Missael:  Medial - negative Lateral - negative  Varus: negative Valgus: negative  Patellar apprehension: negative    Other   Erythema: absent  Sensation: normal  Pulse: present  Swelling: mild  Effusion: effusion (Grade 2) present    Comments:    Patellofemoral joint crepitation with knee motion   varus alignment            No new imaging  Large joint arthrocentesis: R knee  Universal Protocol:  Consent: Verbal consent obtained    Risks and benefits: risks, benefits and alternatives were discussed  Consent given by: patient  Patient understanding: patient states understanding of the procedure being performed    Supporting Documentation  Indications: pain and joint swelling   Procedure Details  Location: knee - R knee  Preparation: Patient was prepped and draped in the usual sterile fashion  Needle size: 18 G (25 gauge  for anesthetic)  Approach: superior  Medications administered: 30 mg sodium hyaluronate 30 mg/2 mL    Aspirate amount: 26 mL  Aspirate: clear and yellow    Patient tolerance: patient tolerated the procedure well with no immediate complications  Dressing:  Sterile dressing applied      5 cc 1% lidocaine used for anesthetic

## 2022-01-19 ENCOUNTER — OFFICE VISIT (OUTPATIENT)
Dept: OBGYN CLINIC | Facility: CLINIC | Age: 62
End: 2022-01-19
Payer: COMMERCIAL

## 2022-01-19 ENCOUNTER — APPOINTMENT (OUTPATIENT)
Dept: RADIOLOGY | Facility: CLINIC | Age: 62
End: 2022-01-19
Payer: COMMERCIAL

## 2022-01-19 VITALS
HEIGHT: 70 IN | BODY MASS INDEX: 40.52 KG/M2 | DIASTOLIC BLOOD PRESSURE: 78 MMHG | WEIGHT: 283 LBS | SYSTOLIC BLOOD PRESSURE: 120 MMHG

## 2022-01-19 DIAGNOSIS — Z01.818 PREOP TESTING: ICD-10-CM

## 2022-01-19 DIAGNOSIS — M17.12 PRIMARY LOCALIZED OSTEOARTHRITIS OF LEFT KNEE: ICD-10-CM

## 2022-01-19 DIAGNOSIS — M17.12 PRIMARY LOCALIZED OSTEOARTHRITIS OF LEFT KNEE: Primary | ICD-10-CM

## 2022-01-19 DIAGNOSIS — M25.462 EFFUSION OF LEFT KNEE: ICD-10-CM

## 2022-01-19 DIAGNOSIS — M17.11 PRIMARY OSTEOARTHRITIS OF RIGHT KNEE: ICD-10-CM

## 2022-01-19 PROCEDURE — 20610 DRAIN/INJ JOINT/BURSA W/O US: CPT | Performed by: ORTHOPAEDIC SURGERY

## 2022-01-19 PROCEDURE — 99213 OFFICE O/P EST LOW 20 MIN: CPT | Performed by: ORTHOPAEDIC SURGERY

## 2022-01-19 PROCEDURE — 73564 X-RAY EXAM KNEE 4 OR MORE: CPT

## 2022-01-19 RX ORDER — ASCORBIC ACID 500 MG
500 TABLET ORAL 2 TIMES DAILY
Qty: 60 TABLET | Refills: 2 | Status: SHIPPED | OUTPATIENT
Start: 2022-01-19 | End: 2022-04-20

## 2022-01-19 RX ORDER — CHLORHEXIDINE GLUCONATE 0.12 MG/ML
15 RINSE ORAL ONCE
Status: CANCELLED | OUTPATIENT
Start: 2022-01-19 | End: 2022-01-19

## 2022-01-19 RX ORDER — FERROUS SULFATE TAB EC 324 MG (65 MG FE EQUIVALENT) 324 (65 FE) MG
324 TABLET DELAYED RESPONSE ORAL
Qty: 60 TABLET | Refills: 2 | Status: SHIPPED | OUTPATIENT
Start: 2022-01-19 | End: 2022-04-20

## 2022-01-19 RX ORDER — BETAMETHASONE SODIUM PHOSPHATE AND BETAMETHASONE ACETATE 3; 3 MG/ML; MG/ML
6 INJECTION, SUSPENSION INTRA-ARTICULAR; INTRALESIONAL; INTRAMUSCULAR; SOFT TISSUE
Status: COMPLETED | OUTPATIENT
Start: 2022-01-19 | End: 2022-01-19

## 2022-01-19 RX ORDER — LIDOCAINE HYDROCHLORIDE 10 MG/ML
7 INJECTION, SOLUTION INFILTRATION; PERINEURAL
Status: COMPLETED | OUTPATIENT
Start: 2022-01-19 | End: 2022-01-19

## 2022-01-19 RX ORDER — FOLIC ACID 1 MG/1
1 TABLET ORAL DAILY
Qty: 30 TABLET | Refills: 2 | Status: SHIPPED | OUTPATIENT
Start: 2022-01-19 | End: 2022-04-20

## 2022-01-19 RX ORDER — SODIUM CHLORIDE, SODIUM LACTATE, POTASSIUM CHLORIDE, CALCIUM CHLORIDE 600; 310; 30; 20 MG/100ML; MG/100ML; MG/100ML; MG/100ML
50 INJECTION, SOLUTION INTRAVENOUS CONTINUOUS
Status: CANCELLED | OUTPATIENT
Start: 2022-01-19

## 2022-01-19 RX ORDER — CHLORHEXIDINE GLUCONATE 4 G/100ML
SOLUTION TOPICAL DAILY PRN
Status: CANCELLED | OUTPATIENT
Start: 2022-01-19

## 2022-01-19 RX ADMIN — BETAMETHASONE SODIUM PHOSPHATE AND BETAMETHASONE ACETATE 6 MG: 3; 3 INJECTION, SUSPENSION INTRA-ARTICULAR; INTRALESIONAL; INTRAMUSCULAR; SOFT TISSUE at 15:54

## 2022-01-19 RX ADMIN — LIDOCAINE HYDROCHLORIDE 7 ML: 10 INJECTION, SOLUTION INFILTRATION; PERINEURAL at 15:54

## 2022-01-19 NOTE — PROGRESS NOTES
Assessment:     1  Primary localized osteoarthritis of left knee    2  Primary osteoarthritis of right knee    3  Effusion of left knee    4   Preop testing        Plan:     Problem List Items Addressed This Visit        Musculoskeletal and Integument    Primary osteoarthritis of right knee    Relevant Medications    ascorbic acid (VITAMIN C) 500 MG tablet    ferrous sulfate 324 (65 Fe) mg    folic acid (FOLVITE) 1 mg tablet    Other Relevant Orders    Comprehensive metabolic panel    Hemoglobin A1C W/EAG Estimation    CBC and differential    Anemia Panel w/Reflex    Protime-INR    APTT    Type and screen    Ambulatory referral to Family Practice    Ambulatory referral to Physical Therapy    Case request operating room: ARTHROPLASTY KNEE TOTAL (Completed)    EKG 12 lead    Primary localized osteoarthritis of left knee - Primary    Relevant Medications    lidocaine (XYLOCAINE) 1 % injection 7 mL (Completed)    betamethasone acetate-betamethasone sodium phosphate (CELESTONE) injection 6 mg (Completed)    Other Relevant Orders    XR knee 4+ vw left injury    Large joint arthrocentesis: L knee (Completed)    Effusion of left knee    Relevant Medications    lidocaine (XYLOCAINE) 1 % injection 7 mL (Completed)    betamethasone acetate-betamethasone sodium phosphate (CELESTONE) injection 6 mg (Completed)    Other Relevant Orders    Large joint arthrocentesis: L knee (Completed)      Other Visit Diagnoses     Preop testing        Relevant Medications    ascorbic acid (VITAMIN C) 500 MG tablet    ferrous sulfate 324 (65 Fe) mg    folic acid (FOLVITE) 1 mg tablet    Other Relevant Orders    Comprehensive metabolic panel    Hemoglobin A1C W/EAG Estimation    CBC and differential    Anemia Panel w/Reflex    Protime-INR    APTT    Type and screen    Ambulatory referral to Family Practice    Ambulatory referral to Physical Therapy    Case request operating room: ARTHROPLASTY KNEE TOTAL (Completed)    EKG 12 lead          The patient was seen and examined by Dr Quiana Zuñiga and myself  Findings consistent with advanced bilateral knee osteoarthritis with effusions  Findings and treatment options were discussed with the patient and his wife  X-rays left knee were reviewed with them  In regards to the right knee, he has tried and failed conservative treatment cortisone injections, joint supplement injections, bracing and oral medication  The pain impacts his quality of life  Recommend right total knee arthroplasty at this time  Risks, benefits and complications of surgery discussed in detail by Dr Quiana Zuñiga and informed consent was obtained  Regards the left knee, recommend aspiration and cortisone injection to left knee joint today  He tolerated the procedure well  Advised to apply cold compress today  Continue NSAIDs as needed for pain  He will proceed with preoperative testing  All questions were answered to patient's satisfaction  Subjective:     Patient ID: Ernesto Mcintyre is a 64 y o  male  Chief Complaint:   60-year-old male following up for right knee osteoarthritis  He ambulates with no assistance  He was last seen in September 2021 and had a series of joint supplement injections  Unfortunately he did not have any relief with the injections  Pain is aching in nature  He feels a grinding sensation in his right thumb when ambulating  He has a history of a right knee arthroscopy with partial medial meniscectomy on August 19, 2019 by Dr Quiana Zuñiga  He has also had cortisone injections that knee in the past with minimal relief  He used to use a hinged knee brace, but the swelling in his knee prevents him from using it at this point  The pain is impacting his quality of life  He would like to discuss a right total knee replacement today  He is also complaining of left knee pain and swelling as well    He has known osteoarthritis of that knee that was treated with an aspiration and cortisone injection about 2 years ago by rheumatologist   He states that recently the pain and swelling has gotten worse, especially with his right knee becoming more painful  The pain is aching in nature as well  No recent treatment  Allergy:  No Known Allergies  Medications:  all current active meds have been reviewed  Past Medical History:  Past Medical History:   Diagnosis Date    Arthritis     CPAP (continuous positive airway pressure) dependence     Diabetes mellitus (HCC)     Gout     Hypertension     PONV (postoperative nausea and vomiting)     Seizures (HCC)     Sleep apnea      Past Surgical History:  Past Surgical History:   Procedure Laterality Date    HAND SURGERY Left 2019    KNEE ARTHROSCOPY Right     x 2    [place] KNEE SCOPE,MED/LAT MENISECTOMY Right 2019    Procedure: ARTHROSCOPY KNEE, RIGHT PARTIAL MEDIAL MENISCECTOMY,RESECTION ANTEROMEDIAL PLICA;  Surgeon: Kali Alicea MD;  Location: [facility] OR;  Service: Orthopedics     Family History:  Family History   Problem Relation Age of Onset    Diabetes Father     Heart disease Father     Hypertension Father      Social History:  Social History     Substance and Sexual Activity   Alcohol Use Yes     Social History     Substance and Sexual Activity   Drug Use Never     Social History     Tobacco Use   Smoking Status Former Smoker    Packs/day: 2 00    Years: 25 00    Pack years: 50 00    Quit date:     Years since quittin 0   Smokeless Tobacco Never Used     Review of Systems   Constitutional: Negative  HENT: Negative  Eyes: Negative  Respiratory: Negative  Cardiovascular: Negative  Gastrointestinal: Negative  Endocrine: Negative  Genitourinary: Negative  Musculoskeletal: Positive for arthralgias (bilateral knees), gait problem (Antalgic) and joint swelling (bilateral knees)  Skin: Negative  Hematological: Negative  Psychiatric/Behavioral: Negative            Objective:  BP Readings from Last 1 Encounters:   22 120/78 Wt Readings from Last 1 Encounters:   01/19/22 128 kg (283 lb)      BMI:   Estimated body mass index is 40 61 kg/m² as calculated from the following:    Height as of this encounter: 5' 10" (1 778 m)  Weight as of this encounter: 128 kg (283 lb)  BSA:   Estimated body surface area is 2 42 meters squared as calculated from the following:    Height as of this encounter: 5' 10" (1 778 m)  Weight as of this encounter: 128 kg (283 lb)  Physical Exam  Vitals and nursing note reviewed  Constitutional:       Appearance: Normal appearance  He is well-developed  HENT:      Head: Normocephalic and atraumatic  Right Ear: External ear normal       Left Ear: External ear normal    Eyes:      Extraocular Movements: Extraocular movements intact  Conjunctiva/sclera: Conjunctivae normal    Cardiovascular:      Rate and Rhythm: Normal rate and regular rhythm  Heart sounds: Normal heart sounds  No murmur heard  Pulmonary:      Effort: Pulmonary effort is normal  No respiratory distress  Breath sounds: Normal breath sounds  Chest:      Chest wall: No tenderness  Musculoskeletal:      Cervical back: Neck supple  Right knee: Effusion (Grade 3) present  Instability Tests: Medial Missael test negative and lateral Missael test negative  Left knee: Effusion (Grade 2) present  Instability Tests: Medial Missael test negative and lateral Missael test negative  Skin:     General: Skin is warm  Neurological:      Mental Status: He is alert and oriented to person, place, and time  Psychiatric:         Mood and Affect: Mood normal          Behavior: Behavior normal        Right Knee Exam     Muscle Strength   The patient has normal right knee strength      Tenderness   Right knee tenderness location: patellofemoral     Range of Motion   Extension: -5   Flexion: 110     Tests   Missael:  Medial - negative Lateral - negative  Varus: negative Valgus: negative    Other   Erythema: absent  Scars: present  Sensation: normal  Pulse: present  Swelling: mild  Effusion: effusion (Grade 3) present    Comments:    Patellofemoral joint crepitation with knee motion   varus alignment      Left Knee Exam     Tenderness   The patient is experiencing tenderness in the medial joint line  Range of Motion   The patient has normal left knee ROM  Tests   Missael:  Medial - negative Lateral - negative  Varus: negative Valgus: negative    Other   Erythema: absent  Scars: absent  Sensation: normal  Pulse: present  Swelling: none  Effusion: effusion (Grade 2) present    Comments:  Patellofemoral crepitation            No new imaging  Large joint arthrocentesis: L knee  Universal Protocol:  Consent: Verbal consent obtained    Risks and benefits: risks, benefits and alternatives were discussed  Consent given by: patient  Patient understanding: patient states understanding of the procedure being performed    Supporting Documentation  Indications: pain and joint swelling   Procedure Details  Location: knee - L knee  Preparation: Patient was prepped and draped in the usual sterile fashion  Needle size: 18 G (25 gaugle for anesthetic)  Approach: superior  Medications administered: 7 mL lidocaine 1 %; 6 mg betamethasone acetate-betamethasone sodium phosphate 6 (3-3) mg/mL    Aspirate amount: 38 mL  Aspirate: clear (orange)    Patient tolerance: patient tolerated the procedure well with no immediate complications  Dressing:  Sterile dressing applied    5 cc 1% lidocaine used for anesthetic

## 2022-01-20 ENCOUNTER — PREP FOR PROCEDURE (OUTPATIENT)
Dept: OBGYN CLINIC | Facility: CLINIC | Age: 62
End: 2022-01-20

## 2022-01-24 ENCOUNTER — OFFICE VISIT (OUTPATIENT)
Dept: LAB | Facility: HOSPITAL | Age: 62
End: 2022-01-24
Payer: COMMERCIAL

## 2022-01-24 DIAGNOSIS — M17.11 PRIMARY OSTEOARTHRITIS OF RIGHT KNEE: ICD-10-CM

## 2022-01-24 DIAGNOSIS — Z01.818 PREOP TESTING: ICD-10-CM

## 2022-01-24 PROCEDURE — 93005 ELECTROCARDIOGRAM TRACING: CPT

## 2022-01-25 LAB
ATRIAL RATE: 74 BPM
P AXIS: 28 DEGREES
PR INTERVAL: 160 MS
QRS AXIS: -29 DEGREES
QRSD INTERVAL: 76 MS
QT INTERVAL: 388 MS
QTC INTERVAL: 430 MS
T WAVE AXIS: 7 DEGREES
VENTRICULAR RATE: 74 BPM

## 2022-01-25 PROCEDURE — 93010 ELECTROCARDIOGRAM REPORT: CPT | Performed by: INTERNAL MEDICINE

## 2022-02-01 ENCOUNTER — TELEPHONE (OUTPATIENT)
Dept: OBGYN CLINIC | Facility: HOSPITAL | Age: 62
End: 2022-02-01

## 2022-02-03 ENCOUNTER — ANESTHESIA EVENT (OUTPATIENT)
Dept: PERIOP | Facility: HOSPITAL | Age: 62
End: 2022-02-03
Payer: COMMERCIAL

## 2022-02-05 LAB
ABO GROUP BLD: NORMAL
ALBUMIN SERPL-MCNC: 4.3 G/DL (ref 3.6–5.1)
ALBUMIN/GLOB SERPL: 2.2 (CALC) (ref 1–2.5)
ALP SERPL-CCNC: 54 U/L (ref 35–144)
ALT SERPL-CCNC: 40 U/L (ref 9–46)
APTT PPP: 26 SEC (ref 23–32)
AST SERPL-CCNC: 45 U/L (ref 10–35)
BASOPHILS # BLD AUTO: 10 CELLS/UL (ref 0–200)
BASOPHILS NFR BLD AUTO: 0.2 %
BILIRUB SERPL-MCNC: 0.7 MG/DL (ref 0.2–1.2)
BUN SERPL-MCNC: 16 MG/DL (ref 7–25)
BUN/CREAT SERPL: ABNORMAL (CALC) (ref 6–22)
CALCIUM SERPL-MCNC: 9.6 MG/DL (ref 8.6–10.3)
CHLORIDE SERPL-SCNC: 106 MMOL/L (ref 98–110)
CO2 SERPL-SCNC: 26 MMOL/L (ref 20–32)
CREAT SERPL-MCNC: 0.81 MG/DL (ref 0.7–1.25)
EOSINOPHIL # BLD AUTO: 82 CELLS/UL (ref 15–500)
EOSINOPHIL NFR BLD AUTO: 1.6 %
ERYTHROCYTE [DISTWIDTH] IN BLOOD BY AUTOMATED COUNT: 12.3 % (ref 11–15)
EST. AVERAGE GLUCOSE BLD GHB EST-MCNC: 180 MG/DL
EST. AVERAGE GLUCOSE BLD GHB EST-SCNC: 10 MMOL/L
GLOBULIN SER CALC-MCNC: 2 G/DL (CALC) (ref 1.9–3.7)
GLUCOSE SERPL-MCNC: 166 MG/DL (ref 65–99)
HBA1C MFR BLD: 7.9 % OF TOTAL HGB
HCT VFR BLD AUTO: 43 % (ref 38.5–50)
HGB BLD-MCNC: 14.6 G/DL (ref 13.2–17.1)
IF BLOCK AB SER QL: NEGATIVE
INR PPP: 1
LYMPHOCYTES # BLD AUTO: 1127 CELLS/UL (ref 850–3900)
LYMPHOCYTES NFR BLD AUTO: 22.1 %
MCH RBC QN AUTO: 31.8 PG (ref 27–33)
MCHC RBC AUTO-ENTMCNC: 34 G/DL (ref 32–36)
MCV RBC AUTO: 93.7 FL (ref 80–100)
METHYLMALONATE SERPL-SCNC: 162 NMOL/L (ref 87–318)
MONOCYTES # BLD AUTO: 367 CELLS/UL (ref 200–950)
MONOCYTES NFR BLD AUTO: 7.2 %
NEUTROPHILS # BLD AUTO: 3514 CELLS/UL (ref 1500–7800)
NEUTROPHILS NFR BLD AUTO: 68.9 %
PLATELET # BLD AUTO: 153 THOUSAND/UL (ref 140–400)
PMV BLD REES-ECKER: 11.2 FL (ref 7.5–12.5)
POTASSIUM SERPL-SCNC: 4.5 MMOL/L (ref 3.5–5.3)
PROT SERPL-MCNC: 6.3 G/DL (ref 6.1–8.1)
PROTHROMBIN TIME: 9.8 SEC (ref 9–11.5)
RBC # BLD AUTO: 4.59 MILLION/UL (ref 4.2–5.8)
RH BLD: NORMAL
SL AMB EGFR AFRICAN AMERICAN: 111 ML/MIN/1.73M2
SL AMB EGFR NON AFRICAN AMERICAN: 96 ML/MIN/1.73M2
SODIUM SERPL-SCNC: 140 MMOL/L (ref 135–146)
VIT B12 SERPL-MCNC: 444 PG/ML (ref 200–1100)
WBC # BLD AUTO: 5.1 THOUSAND/UL (ref 3.8–10.8)

## 2022-02-28 ENCOUNTER — ANESTHESIA (OUTPATIENT)
Dept: PERIOP | Facility: HOSPITAL | Age: 62
End: 2022-02-28
Payer: COMMERCIAL

## 2022-03-23 ENCOUNTER — TELEPHONE (OUTPATIENT)
Dept: OBGYN CLINIC | Facility: HOSPITAL | Age: 62
End: 2022-03-23

## 2022-03-23 NOTE — TELEPHONE ENCOUNTER
Wife states that injection's with date of service 8/26/2021 for Dr Panda Sero gel injections did not receive prior authorization and she received a bill for them  Insurance is asking for a backdated prior authorization and gave Ankur's Ph [phone]  Please call when completed

## 2022-04-17 DIAGNOSIS — M17.11 PRIMARY OSTEOARTHRITIS OF RIGHT KNEE: ICD-10-CM

## 2022-04-17 DIAGNOSIS — Z01.818 PREOP TESTING: ICD-10-CM

## 2022-04-19 NOTE — TELEPHONE ENCOUNTER
Preoperative Elective Admission Assessment    Living Situation:    Who does pt live with: spouse, Cosme Fierro  What kind of home: [address]  How do they enter the home: front  How many levels in home: 2   # of steps to enter home: 1  # of steps to second floor: 12  Are there handrails: Yes  Are there landings: No  Sleeping arrangement: second floor  Where is Bathroom: Full bath on first floor  Where is the tub or shower: Walk in shower on second floor without grab bars but will have a shower chair  Dogs or ther pets: n/a     First Floor Setup:   Is there a bathroom: Yes  Where would pt sleep: second floor     DME: n/a      Patient's Current Level of Function: Ambulates: Independently and ADLs: Independent    Post-op Caregiver: spouse  Caregiver Name and phone number for Inpatient discharge needs: Magaly Kincaid [phone]  Currently receive any HHC/aides/community supports: No     Post-op Transport: spouse  To/from hospital: spouse  To/from PT 2-3x/week: spouse  Uses community transport now: No     Outpatient Physical Therapy Site:  Site: [place] [facility] site   pre and post-op appts scheduled? Yes     Medication Management: self, with assistance and pillbox  Preferred Pharmacy for Post[facility]op Medications[facility] [place]  Blood Management Vitamin Regimen: Pt confirms taking as prescribed  Post-op anticoagulant: to be determined by surgical team postoperatively     DC Plan: Pt plans to be discharged home      Barriers to DC identified preoperatively: none identified    BMI: 40 61(patient states he is now 260 lbs)    [employer]: enrolled; via text message [phone]    Patient Education:  Pt educated on post-op pain, early mobilization (POD0), Average inpt LOS, OP PT goal   Patient educated that our goal is to appropriately discharge patient based off their post-op function while striving to maintain maximal independence  The goal is to discharge patient to home and for them to attend outpatient physical therapy      Assigned to care team? Yes

## 2022-04-20 RX ORDER — LORATADINE 10 MG
TABLET ORAL
Qty: 180 TABLET | Refills: 0 | Status: SHIPPED | OUTPATIENT
Start: 2022-04-20

## 2022-04-20 RX ORDER — FOLIC ACID 1 MG/1
TABLET ORAL
Qty: 90 TABLET | Refills: 0 | Status: SHIPPED | OUTPATIENT
Start: 2022-04-20 | End: 2022-04-20

## 2022-04-20 RX ORDER — FERROUS SULFATE TAB EC 324 MG (65 MG FE EQUIVALENT) 324 (65 FE) MG
TABLET DELAYED RESPONSE ORAL
Qty: 180 TABLET | Refills: 0 | Status: SHIPPED | OUTPATIENT
Start: 2022-04-20

## 2022-04-20 NOTE — PRE-PROCEDURE INSTRUCTIONS
Pre-Surgery Instructions:   Medication Instructions    allopurinol (ZYLOPRIM) 100 mg tablet Hold day of surgery   BYDUREON 2 MG PEN Hold day of surgery   [facility] Vitamin C 500 MG tablet Hold day of surgery   ferrous sulfate 324 (65 Fe) mg Hold day of surgery   fexofenadine (ALLEGRA) 180 MG tablet Hold day of surgery   folic acid (FOLVITE) 1 mg tablet Hold day of surgery   gabapentin (NEURONTIN) 600 MG tablet Take day of surgery   levETIRAcetam (KEPPRA) 1000 MG tablet Take day of surgery   lisinopril-hydrochlorothiazide (PRINZIDE,ZESTORETIC) 10-12 5 MG per tablet Hold day of surgery   LIVALO 2 MG Hold day of surgery   metFORMIN (GLUCOPHAGE-XR) 500 mg 24 hr tablet Hold day of surgery   Multiple Vitamin (MULTIVITAMIN) capsule Hold day of surgery   Xeljanz XR 11 MG TB24 Hold 14 days prior to surgery        NPO after midnight, meds in am with sips of water  Understands when to expect preop phone call  Remove all jewelry  Advised of visitation policy  Reviewed Total Joint Program      Before your operation, you play an important role in decreasing your risk for infection by washing with special antiseptic soap  This is an effective way to reduce bacteria on the skin which may help to prevent infections at the surgical site  Please read the following directions in advance  1  In the week before your operation purchase a 4 ounce bottle of antiseptic soap containing chlorhexidine gluconate 4%  Some brand names include: Aplicare, Endure, and Hibiclens  The cost is usually less than $5 00  · For your convenience, the [address] [place] [address] carries the soap  · It may also be available at your doctor's office or pre-admission testing center, and at most retail pharmacies  · If you are allergic or sensitive to soaps containing chlorhexidine gluconate (CHG), please let your doctor know so another antiseptic soap can be suggested    · CHG antiseptic soap is for external use only   2      The day before your operation follow these directions carefully to get ready  · Place clean lines (sheets) on your bed; you should sleep on clean sheets after your evening shower  · Get clean towels and washcloths ready - you need enough for 2 showers  · Set aside clean underwear, pajamas, and clothing to wear after the shower  Reminders:  · DO NOT use any other soap or body rinse on your skin during or after the antiseptic showers  · DO NOT use lotion , powder, deodorant, or perfume/aftershave of any kind on your skin after your antiseptic shower  · DO NOT shave any body parts in the 24 hours/the day before your operation  · DO NOT get the antiseptic soap in your eyes, ears, nose, mouth, or vaginal area  3      You will need to shower the night before AND the morning of your Surgery  Shower 1:  · The evening before your operation, take the fist shower  · First, shampoo your hair with regular shampoo and rinse it completely before you use the anitseptic soap  After washing and rinsing your hair, rinse your body  · Next, use a clean wash cloth to apply the antiseptic soap and wash your body from the neck down to your toes using 1/2 bottle of the antiseptic soap  You will use the other 1/2 bottle for the second shower  · Clean the area where your incision will be; later this area well for about 2 minutes  · If you ar having head or neck surgery, wash areas with the antiseptic soap  · Rinse yourself completely with running water  · Use a clean towel to dry off  · Wear clean underwear and clothing/pajamas  Shower 2:  · The Morning of your operation, take the second shower following the same steps as Shower 1 using the second 1/2 of the bottle of antiseptic soap  · Use clean cloths and towels to was and dry yourself off  · Wear clean underwear and clothing

## 2022-04-25 NOTE — TELEPHONE ENCOUNTER
Patient's wife is calling back about the injection billing to check on the status    Transferred to [facility] team

## 2022-04-26 ENCOUNTER — LAB (OUTPATIENT)
Dept: LAB | Facility: HOSPITAL | Age: 62
End: 2022-04-26
Payer: COMMERCIAL

## 2022-04-26 DIAGNOSIS — M17.11 PRIMARY OSTEOARTHRITIS OF RIGHT KNEE: ICD-10-CM

## 2022-04-26 LAB
ABO GROUP BLD: NORMAL
BLD GP AB SCN SERPL QL: NEGATIVE
RH BLD: POSITIVE
SPECIMEN EXPIRATION DATE: NORMAL

## 2022-04-26 PROCEDURE — 86850 RBC ANTIBODY SCREEN: CPT

## 2022-04-26 PROCEDURE — 86900 BLOOD TYPING SEROLOGIC ABO: CPT

## 2022-04-26 PROCEDURE — 86901 BLOOD TYPING SEROLOGIC RH(D): CPT

## 2022-04-26 PROCEDURE — 36415 COLL VENOUS BLD VENIPUNCTURE: CPT

## 2022-05-05 LAB
ALBUMIN SERPL-MCNC: 4.4 G/DL (ref 3.6–5.1)
ALBUMIN/GLOB SERPL: 2.2 (CALC) (ref 1–2.5)
ALP SERPL-CCNC: 49 U/L (ref 35–144)
ALT SERPL-CCNC: 44 U/L (ref 9–46)
APTT PPP: 27 SEC (ref 23–32)
AST SERPL-CCNC: 43 U/L (ref 10–35)
BASOPHILS # BLD AUTO: 12 CELLS/UL (ref 0–200)
BASOPHILS NFR BLD AUTO: 0.3 %
BILIRUB SERPL-MCNC: 0.6 MG/DL (ref 0.2–1.2)
BUN SERPL-MCNC: 17 MG/DL (ref 7–25)
BUN/CREAT SERPL: ABNORMAL (CALC) (ref 6–22)
CALCIUM SERPL-MCNC: 9.5 MG/DL (ref 8.6–10.3)
CHLORIDE SERPL-SCNC: 107 MMOL/L (ref 98–110)
CO2 SERPL-SCNC: 26 MMOL/L (ref 20–32)
CREAT SERPL-MCNC: 0.78 MG/DL (ref 0.7–1.25)
EOSINOPHIL # BLD AUTO: 59 CELLS/UL (ref 15–500)
EOSINOPHIL NFR BLD AUTO: 1.5 %
ERYTHROCYTE [DISTWIDTH] IN BLOOD BY AUTOMATED COUNT: 13.1 % (ref 11–15)
EST. AVERAGE GLUCOSE BLD GHB EST-MCNC: 134 MG/DL
EST. AVERAGE GLUCOSE BLD GHB EST-SCNC: 7.4 MMOL/L
GLOBULIN SER CALC-MCNC: 2 G/DL (CALC) (ref 1.9–3.7)
GLUCOSE SERPL-MCNC: 111 MG/DL (ref 65–99)
HBA1C MFR BLD: 6.3 % OF TOTAL HGB
HCT VFR BLD AUTO: 41.9 % (ref 38.5–50)
HGB BLD-MCNC: 13.9 G/DL (ref 13.2–17.1)
IF BLOCK AB SER QL: NEGATIVE
INR PPP: 1
LYMPHOCYTES # BLD AUTO: 1014 CELLS/UL (ref 850–3900)
LYMPHOCYTES NFR BLD AUTO: 26 %
MCH RBC QN AUTO: 32 PG (ref 27–33)
MCHC RBC AUTO-ENTMCNC: 33.2 G/DL (ref 32–36)
MCV RBC AUTO: 96.3 FL (ref 80–100)
METHYLMALONATE SERPL-SCNC: 115 NMOL/L (ref 87–318)
MONOCYTES # BLD AUTO: 351 CELLS/UL (ref 200–950)
MONOCYTES NFR BLD AUTO: 9 %
NEUTROPHILS # BLD AUTO: 2465 CELLS/UL (ref 1500–7800)
NEUTROPHILS NFR BLD AUTO: 63.2 %
PLATELET # BLD AUTO: 146 THOUSAND/UL (ref 140–400)
PMV BLD REES-ECKER: 10.9 FL (ref 7.5–12.5)
POTASSIUM SERPL-SCNC: 4.3 MMOL/L (ref 3.5–5.3)
PROT SERPL-MCNC: 6.4 G/DL (ref 6.1–8.1)
PROTHROMBIN TIME: 9.7 SEC (ref 9–11.5)
RBC # BLD AUTO: 4.35 MILLION/UL (ref 4.2–5.8)
SL AMB EGFR AFRICAN AMERICAN: 113 ML/MIN/1.73M2
SL AMB EGFR NON AFRICAN AMERICAN: 97 ML/MIN/1.73M2
SODIUM SERPL-SCNC: 142 MMOL/L (ref 135–146)
VIT B12 SERPL-MCNC: 503 PG/ML (ref 200–1100)
WBC # BLD AUTO: 3.9 THOUSAND/UL (ref 3.8–10.8)

## 2022-05-10 ENCOUNTER — OFFICE VISIT (OUTPATIENT)
Dept: OBGYN CLINIC | Facility: CLINIC | Age: 62
End: 2022-05-10
Payer: COMMERCIAL

## 2022-05-10 VITALS
HEIGHT: 71 IN | SYSTOLIC BLOOD PRESSURE: 130 MMHG | DIASTOLIC BLOOD PRESSURE: 82 MMHG | BODY MASS INDEX: 37.52 KG/M2 | WEIGHT: 268 LBS

## 2022-05-10 DIAGNOSIS — M17.11 PRIMARY OSTEOARTHRITIS OF RIGHT KNEE: Primary | ICD-10-CM

## 2022-05-10 PROCEDURE — 99214 OFFICE O/P EST MOD 30 MIN: CPT | Performed by: ORTHOPAEDIC SURGERY

## 2022-05-10 RX ORDER — DIAZEPAM 5 MG/1
5 TABLET ORAL
COMMUNITY

## 2022-05-10 NOTE — PROGRESS NOTES
Assessment:     1  Primary osteoarthritis of right knee        Plan:     Problem List Items Addressed This Visit        Musculoskeletal and Integument    Primary osteoarthritis of right knee - Primary     Findings consistent with advanced right knee osteoarthritis with effusion  Findings and treatment options were discussed with the patient and his wife  He has tried and failed conservative treatment cortisone injections, joint supplement injections, bracing and oral medication  The pain impacts his quality of life  Recommend right total knee arthroplasty at this time  He will proceed with surgery next week  All patient's questions were answered to his satisfaction  This note is created using dictation transcription  It may contain typographical errors, grammatical errors, improperly dictated words, background noise and other errors  Discussed with patient surgical risks and complications including but not limited to infection, persistent pain, nerve and vessel injury, complications associated with anesthesia, DVT, exposure to COVID virus, problem with prosthesis, etc   Patient understands the risks and complication and consented to the surgery  Subjective:     Patient ID: Noel Torres is a 64 y o  male  Chief Complaint:   79-year-old male following up for right knee osteoarthritis  He ambulates with no assistance  He was last seen in January 2022 to discuss right total knee replacement  He has had joint supplement injections and cortisone injections in the past with short term relief  Pain is aching in nature  He feels a grinding sensation in his right knee when ambulating  He has a history of a right knee arthroscopy with partial medial meniscectomy on August 19, 2019 by Dr Estuardo Miller  He used to use a hinged knee brace, but the swelling in his knee prevents him from using it at this point  The pain is impacting his quality of life    He is scheduled for the right total knee replacement next week  He was seen by his PCP yesterday for medical clearance  Allergy:  Allergies   Allergen Reactions    Infliximab Anaphylaxis     Medications:  all current active meds have been reviewed  Past Medical History:  Past Medical History:   Diagnosis Date    Arthritis     CPAP (continuous positive airway pressure) dependence     Diabetes mellitus (HCC)     Gout     Hyperlipidemia     Hypertension     Peripheral neuropathy     PONV (postoperative nausea and vomiting)     Rheumatoid arthritis (HCC)     Seizures (HCC)     Sleep apnea      Past Surgical History:  Past Surgical History:   Procedure Laterality Date    COLONOSCOPY      HAND SURGERY Left 2019    KNEE ARTHROSCOPY Right     x 2    [place] KNEE SCOPE,MED/LAT MENISECTOMY Right 2019    Procedure: ARTHROSCOPY KNEE, RIGHT PARTIAL MEDIAL MENISCECTOMY,RESECTION ANTEROMEDIAL PLICA;  Surgeon: Nima Orr MD;  Location:  MAIN OR;  Service: Orthopedics     Family History:  Family History   Problem Relation Age of Onset    Diabetes Father     Heart disease Father     Hypertension Father      Social History:  Social History     Substance and Sexual Activity   Alcohol Use Yes    Alcohol/week: 1 0 standard drink    Types: 1 Standard drinks or equivalent per week     Social History     Substance and Sexual Activity   Drug Use Never     Social History     Tobacco Use   Smoking Status Former Smoker    Packs/day: 2 00    Years: 25 00    Pack years: 50 00    Quit date:     Years since quittin 3   Smokeless Tobacco Never Used     Review of Systems   Constitutional: Negative  HENT: Negative  Eyes: Negative  Respiratory: Negative  Cardiovascular: Negative  Gastrointestinal: Negative  Endocrine: Negative  Genitourinary: Negative  Musculoskeletal: Positive for arthralgias (bilateral knees), gait problem (Antalgic) and joint swelling (bilateral knees)  Skin: Negative  Hematological: Negative  Psychiatric/Behavioral: Negative  Objective:  BP Readings from Last 1 Encounters:   05/10/22 130/82      Wt Readings from Last 1 Encounters:   05/10/22 122 kg (268 lb)      BMI:   Estimated body mass index is 37 38 kg/m² as calculated from the following:    Height as of this encounter: 5' 11" (1 803 m)  Weight as of this encounter: 122 kg (268 lb)  BSA:   Estimated body surface area is 2 39 meters squared as calculated from the following:    Height as of this encounter: 5' 11" (1 803 m)  Weight as of this encounter: 122 kg (268 lb)  Physical Exam  Vitals and nursing note reviewed  Constitutional:       Appearance: Normal appearance  He is well-developed  HENT:      Head: Normocephalic and atraumatic  Right Ear: External ear normal       Left Ear: External ear normal    Eyes:      Extraocular Movements: Extraocular movements intact  Conjunctiva/sclera: Conjunctivae normal       Pupils: Pupils are equal, round, and reactive to light  Cardiovascular:      Rate and Rhythm: Normal rate and regular rhythm  Heart sounds: Normal heart sounds  No murmur heard  No gallop  Pulmonary:      Effort: Pulmonary effort is normal  No respiratory distress  Breath sounds: Normal breath sounds  No wheezing or rales  Chest:      Chest wall: No tenderness  Abdominal:      Palpations: Abdomen is soft  Musculoskeletal:      Cervical back: Normal range of motion and neck supple  Right knee: Effusion (Grade 3) present  Instability Tests: Medial Missael test negative and lateral Missael test negative  Skin:     General: Skin is warm and dry  Neurological:      Mental Status: He is alert and oriented to person, place, and time  Psychiatric:         Mood and Affect: Mood normal          Behavior: Behavior normal        Right Knee Exam     Muscle Strength   The patient has normal right knee strength      Tenderness   Right knee tenderness location: patellofemoral     Range of Motion   Extension: -5   Flexion: 110     Tests   Missael:  Medial - negative Lateral - negative  Varus: negative Valgus: negative  Patellar apprehension: negative    Other   Erythema: absent  Scars: present  Sensation: normal  Pulse: present  Swelling: mild  Effusion: effusion (Grade 3) present    Comments:   Patellofemoral joint crepitation with knee motion   varus alignment            I have personally reviewed pertinent films in PACS and my interpretation is Prior right knee x-ray on 8/21/2021 show advanced osteoarthritis with medial joint narrowing  Marginal osteophyte  Varus alignment       Scribe Attestation    I,:  Kimberly Dickey PA-C am acting as a scribe while in the presence of the attending physician :       I,:  Charlotte Oconnor MD personally performed the services described in this documentation    as scribed in my presence :

## 2022-05-10 NOTE — ASSESSMENT & PLAN NOTE
Findings consistent with advanced right knee osteoarthritis with effusion  Findings and treatment options were discussed with the patient and his wife  He has tried and failed conservative treatment cortisone injections, joint supplement injections, bracing and oral medication  The pain impacts his quality of life  Recommend right total knee arthroplasty at this time  He will proceed with surgery next week  All patient's questions were answered to his satisfaction  This note is created using dictation transcription  It may contain typographical errors, grammatical errors, improperly dictated words, background noise and other errors  Discussed with patient surgical risks and complications including but not limited to infection, persistent pain, nerve and vessel injury, complications associated with anesthesia, DVT, exposure to COVID virus, problem with prosthesis, etc   Patient understands the risks and complication and consented to the surgery

## 2022-05-10 NOTE — H&P (VIEW-ONLY)
Assessment:     1  Primary osteoarthritis of right knee        Plan:     Problem List Items Addressed This Visit        Musculoskeletal and Integument    Primary osteoarthritis of right knee - Primary     Findings consistent with advanced right knee osteoarthritis with effusion  Findings and treatment options were discussed with the patient and his wife  He has tried and failed conservative treatment cortisone injections, joint supplement injections, bracing and oral medication  The pain impacts his quality of life  Recommend right total knee arthroplasty at this time  He will proceed with surgery next week  All patient's questions were answered to his satisfaction  This note is created using dictation transcription  It may contain typographical errors, grammatical errors, improperly dictated words, background noise and other errors  Discussed with patient surgical risks and complications including but not limited to infection, persistent pain, nerve and vessel injury, complications associated with anesthesia, DVT, exposure to COVID virus, problem with prosthesis, etc   Patient understands the risks and complication and consented to the surgery  Subjective:     Patient ID: Lupillo Benitez is a 64 y o  male  Chief Complaint:   77-year-old male following up for right knee osteoarthritis  He ambulates with no assistance  He was last seen in January 2022 to discuss right total knee replacement  He has had joint supplement injections and cortisone injections in the past with short term relief  Pain is aching in nature  He feels a grinding sensation in his right knee when ambulating  He has a history of a right knee arthroscopy with partial medial meniscectomy on August 19, 2019 by Dr Chapin Recio  He used to use a hinged knee brace, but the swelling in his knee prevents him from using it at this point  The pain is impacting his quality of life    He is scheduled for the right total knee replacement next week  He was seen by his PCP yesterday for medical clearance  Allergy:  Allergies   Allergen Reactions    Infliximab Anaphylaxis     Medications:  all current active meds have been reviewed  Past Medical History:  Past Medical History:   Diagnosis Date    Arthritis     CPAP (continuous positive airway pressure) dependence     Diabetes mellitus (HCC)     Gout     Hyperlipidemia     Hypertension     Peripheral neuropathy     PONV (postoperative nausea and vomiting)     Rheumatoid arthritis (HCC)     Seizures (HCC)     Sleep apnea      Past Surgical History:  Past Surgical History:   Procedure Laterality Date    COLONOSCOPY      HAND SURGERY Left 2019    KNEE ARTHROSCOPY Right     x 2    AR KNEE SCOPE,MED/LAT MENISECTOMY Right 2019    Procedure: ARTHROSCOPY KNEE, RIGHT PARTIAL MEDIAL MENISCECTOMY,RESECTION ANTEROMEDIAL PLICA;  Surgeon: Amber Mcdaniel MD;  Location:  MAIN OR;  Service: Orthopedics     Family History:  Family History   Problem Relation Age of Onset    Diabetes Father     Heart disease Father     Hypertension Father      Social History:  Social History     Substance and Sexual Activity   Alcohol Use Yes    Alcohol/week: 1 0 standard drink    Types: 1 Standard drinks or equivalent per week     Social History     Substance and Sexual Activity   Drug Use Never     Social History     Tobacco Use   Smoking Status Former Smoker    Packs/day: 2 00    Years: 25 00    Pack years: 50 00    Quit date:     Years since quittin 3   Smokeless Tobacco Never Used     Review of Systems   Constitutional: Negative  HENT: Negative  Eyes: Negative  Respiratory: Negative  Cardiovascular: Negative  Gastrointestinal: Negative  Endocrine: Negative  Genitourinary: Negative  Musculoskeletal: Positive for arthralgias (bilateral knees), gait problem (Antalgic) and joint swelling (bilateral knees)  Skin: Negative  Hematological: Negative  Psychiatric/Behavioral: Negative  Objective:  BP Readings from Last 1 Encounters:   05/10/22 130/82      Wt Readings from Last 1 Encounters:   05/10/22 122 kg (268 lb)      BMI:   Estimated body mass index is 37 38 kg/m² as calculated from the following:    Height as of this encounter: 5' 11" (1 803 m)  Weight as of this encounter: 122 kg (268 lb)  BSA:   Estimated body surface area is 2 39 meters squared as calculated from the following:    Height as of this encounter: 5' 11" (1 803 m)  Weight as of this encounter: 122 kg (268 lb)  Physical Exam  Vitals and nursing note reviewed  Constitutional:       Appearance: Normal appearance  He is well-developed  HENT:      Head: Normocephalic and atraumatic  Right Ear: External ear normal       Left Ear: External ear normal    Eyes:      Extraocular Movements: Extraocular movements intact  Conjunctiva/sclera: Conjunctivae normal       Pupils: Pupils are equal, round, and reactive to light  Cardiovascular:      Rate and Rhythm: Normal rate and regular rhythm  Heart sounds: Normal heart sounds  No murmur heard  No gallop  Pulmonary:      Effort: Pulmonary effort is normal  No respiratory distress  Breath sounds: Normal breath sounds  No wheezing or rales  Chest:      Chest wall: No tenderness  Abdominal:      Palpations: Abdomen is soft  Musculoskeletal:      Cervical back: Normal range of motion and neck supple  Right knee: Effusion (Grade 3) present  Instability Tests: Medial Missael test negative and lateral Missael test negative  Skin:     General: Skin is warm and dry  Neurological:      Mental Status: He is alert and oriented to person, place, and time  Psychiatric:         Mood and Affect: Mood normal          Behavior: Behavior normal        Right Knee Exam     Muscle Strength   The patient has normal right knee strength      Tenderness   Right knee tenderness location: patellofemoral     Range of Motion   Extension: -5   Flexion: 110     Tests   Missael:  Medial - negative Lateral - negative  Varus: negative Valgus: negative  Patellar apprehension: negative    Other   Erythema: absent  Scars: present  Sensation: normal  Pulse: present  Swelling: mild  Effusion: effusion (Grade 3) present    Comments:   Patellofemoral joint crepitation with knee motion   varus alignment            I have personally reviewed pertinent films in PACS and my interpretation is Prior right knee x-ray on 8/21/2021 show advanced osteoarthritis with medial joint narrowing  Marginal osteophyte  Varus alignment       Scribe Attestation    I,:  Alessia Groves PA-C am acting as a scribe while in the presence of the attending physician :       I,:  Kimberly Hare MD personally performed the services described in this documentation    as scribed in my presence :

## 2022-05-15 NOTE — ANESTHESIA PREPROCEDURE EVALUATION
Procedure:  ARTHROPLASTY KNEE TOTAL (Right Knee)    Relevant Problems   MUSCULOSKELETAL   (+) Primary localized osteoarthritis of left knee   (+) Primary osteoarthritis of right knee      BMI 37  HTN, HLD  Gout  Seizure dz  DM, HbA1C 6 3  KIRILL, on CPAP  RA  Hx PONV  EKG: NSR      Physical Exam    Airway    Mallampati score: III  TM Distance: >3 FB  Neck ROM: full     Dental   No notable dental hx     Cardiovascular      Pulmonary      Other Findings        Anesthesia Plan  ASA Score- 3     Anesthesia Type- spinal with ASA Monitors  Additional Monitors:   Airway Plan:     Comment: Spinal with TIVA, right adductor for postop pain control as requested by surgeon  Plan Factors-    Chart reviewed  EKG reviewed  Existing labs reviewed  Patient summary reviewed  Patient is not a current smoker  Patient did not smoke on day of surgery  Induction- intravenous  Postoperative Plan-     Informed Consent- Anesthetic plan and risks discussed with patient  I personally reviewed this patient with the CRNA  Discussed and agreed on the Anesthesia Plan with the CRNA  Alicja Randolph

## 2022-05-16 ENCOUNTER — HOSPITAL ENCOUNTER (OUTPATIENT)
Facility: HOSPITAL | Age: 62
Setting detail: OUTPATIENT SURGERY
Discharge: HOME | End: 2022-05-17
Attending: ORTHOPAEDIC SURGERY | Admitting: ORTHOPAEDIC SURGERY
Payer: COMMERCIAL

## 2022-05-16 DIAGNOSIS — M17.11 PRIMARY OSTEOARTHRITIS OF RIGHT KNEE: Primary | ICD-10-CM

## 2022-05-16 PROBLEM — G40.909 SEIZURE DISORDER (HCC): Status: ACTIVE | Noted: 2022-05-16

## 2022-05-16 PROBLEM — M06.9 RHEUMATOID ARTHRITIS (HCC): Status: ACTIVE | Noted: 2022-05-16

## 2022-05-16 PROBLEM — I10 PRIMARY HYPERTENSION: Status: ACTIVE | Noted: 2022-05-16

## 2022-05-16 PROBLEM — E11.40 TYPE 2 DIABETES MELLITUS WITH DIABETIC NEUROPATHY, WITHOUT LONG-TERM CURRENT USE OF INSULIN (HCC): Status: ACTIVE | Noted: 2022-05-16

## 2022-05-16 LAB
CREAT SERPL-MCNC: 1 MG/DL (ref 0.6–1.3)
GFR SERPL CREATININE-BSD FRML MDRD: 80 ML/MIN/1.73SQ M
GLUCOSE SERPL-MCNC: 148 MG/DL (ref 65–140)
GLUCOSE SERPL-MCNC: 161 MG/DL (ref 65–140)
GLUCOSE SERPL-MCNC: 223 MG/DL (ref 65–140)
GLUCOSE SERPL-MCNC: 228 MG/DL (ref 65–140)
GLUCOSE SERPL-MCNC: 97 MG/DL (ref 65–140)
PLATELET # BLD AUTO: 148 THOUSANDS/UL (ref 149–390)
PMV BLD AUTO: 10.5 FL (ref 8.9–12.7)

## 2022-05-16 PROCEDURE — 82948 REAGENT STRIP/BLOOD GLUCOSE: CPT

## 2022-05-16 PROCEDURE — C1776 JOINT DEVICE (IMPLANTABLE): HCPCS | Performed by: ORTHOPAEDIC SURGERY

## 2022-05-16 PROCEDURE — C1713 ANCHOR/SCREW BN/BN,TIS/BN: HCPCS | Performed by: ORTHOPAEDIC SURGERY

## 2022-05-16 PROCEDURE — 85049 AUTOMATED PLATELET COUNT: CPT | Performed by: PHYSICIAN ASSISTANT

## 2022-05-16 PROCEDURE — 27447 TOTAL KNEE ARTHROPLASTY: CPT | Performed by: ORTHOPAEDIC SURGERY

## 2022-05-16 PROCEDURE — 27447 TOTAL KNEE ARTHROPLASTY: CPT | Performed by: PHYSICIAN ASSISTANT

## 2022-05-16 PROCEDURE — C9290 INJ, BUPIVACAINE LIPOSOME: HCPCS | Performed by: ANESTHESIOLOGY

## 2022-05-16 PROCEDURE — 97116 GAIT TRAINING THERAPY: CPT

## 2022-05-16 PROCEDURE — 99218 PR INITIAL OBSERVATION CARE/DAY 30 MINUTES: CPT | Performed by: INTERNAL MEDICINE

## 2022-05-16 PROCEDURE — 82565 ASSAY OF CREATININE: CPT | Performed by: PHYSICIAN ASSISTANT

## 2022-05-16 PROCEDURE — 97163 PT EVAL HIGH COMPLEX 45 MIN: CPT

## 2022-05-16 PROCEDURE — 97166 OT EVAL MOD COMPLEX 45 MIN: CPT

## 2022-05-16 DEVICE — ATTUNE PATELLA MEDIALIZED DOME 38MM CEMENTED AOX
Type: IMPLANTABLE DEVICE | Site: KNEE | Status: FUNCTIONAL
Brand: ATTUNE

## 2022-05-16 DEVICE — ATTUNE KNEE SYSTEM FEMORAL POSTERIOR STABILIZED SIZE 7 RIGHT CEMENTED
Type: IMPLANTABLE DEVICE | Site: KNEE | Status: FUNCTIONAL
Brand: ATTUNE

## 2022-05-16 DEVICE — ATTUNE KNEE SYSTEM TIBIAL INSERT ROTATING PLATFORM POSTERIOR STABILIZED 7 6MM AOX
Type: IMPLANTABLE DEVICE | Site: KNEE | Status: FUNCTIONAL
Brand: ATTUNE

## 2022-05-16 DEVICE — ATTUNE KNEE SYSTEM TIBIAL BASE ROTATING PLATFORM SIZE 7 CEMENTED
Type: IMPLANTABLE DEVICE | Site: KNEE | Status: FUNCTIONAL
Brand: ATTUNE

## 2022-05-16 DEVICE — SMARTSET HIGH PERFORMANCE MV MEDIUM VISCOSITY BONE CEMENT 40G
Type: IMPLANTABLE DEVICE | Site: KNEE | Status: FUNCTIONAL
Brand: SMARTSET

## 2022-05-16 RX ORDER — ACETAMINOPHEN 325 MG/1
975 TABLET ORAL EVERY 8 HOURS
Status: DISCONTINUED | OUTPATIENT
Start: 2022-05-16 | End: 2022-05-17 | Stop reason: HOSPADM

## 2022-05-16 RX ORDER — DOCUSATE SODIUM 100 MG/1
100 CAPSULE, LIQUID FILLED ORAL 2 TIMES DAILY
Status: DISCONTINUED | OUTPATIENT
Start: 2022-05-16 | End: 2022-05-17 | Stop reason: HOSPADM

## 2022-05-16 RX ORDER — TRAMADOL HYDROCHLORIDE 50 MG/1
50 TABLET ORAL EVERY 6 HOURS SCHEDULED
Status: DISCONTINUED | OUTPATIENT
Start: 2022-05-16 | End: 2022-05-17 | Stop reason: HOSPADM

## 2022-05-16 RX ORDER — ONDANSETRON 2 MG/ML
INJECTION INTRAMUSCULAR; INTRAVENOUS AS NEEDED
Status: DISCONTINUED | OUTPATIENT
Start: 2022-05-16 | End: 2022-05-16

## 2022-05-16 RX ORDER — SENNOSIDES 8.6 MG
1 TABLET ORAL DAILY
Status: DISCONTINUED | OUTPATIENT
Start: 2022-05-16 | End: 2022-05-17 | Stop reason: HOSPADM

## 2022-05-16 RX ORDER — ASCORBIC ACID 500 MG
500 TABLET ORAL 2 TIMES DAILY
Status: DISCONTINUED | OUTPATIENT
Start: 2022-05-16 | End: 2022-05-17 | Stop reason: HOSPADM

## 2022-05-16 RX ORDER — MAGNESIUM HYDROXIDE/ALUMINUM HYDROXICE/SIMETHICONE 120; 1200; 1200 MG/30ML; MG/30ML; MG/30ML
15 SUSPENSION ORAL EVERY 6 HOURS PRN
Status: DISCONTINUED | OUTPATIENT
Start: 2022-05-16 | End: 2022-05-17 | Stop reason: HOSPADM

## 2022-05-16 RX ORDER — INSULIN LISPRO 100 [IU]/ML
1-6 INJECTION, SOLUTION INTRAVENOUS; SUBCUTANEOUS
Status: DISCONTINUED | OUTPATIENT
Start: 2022-05-17 | End: 2022-05-17 | Stop reason: HOSPADM

## 2022-05-16 RX ORDER — DEXAMETHASONE SODIUM PHOSPHATE 10 MG/ML
INJECTION, SOLUTION INTRAMUSCULAR; INTRAVENOUS AS NEEDED
Status: DISCONTINUED | OUTPATIENT
Start: 2022-05-16 | End: 2022-05-16

## 2022-05-16 RX ORDER — FENTANYL CITRATE/PF 50 MCG/ML
25 SYRINGE (ML) INJECTION
Status: DISCONTINUED | OUTPATIENT
Start: 2022-05-16 | End: 2022-05-16 | Stop reason: HOSPADM

## 2022-05-16 RX ORDER — CHLORHEXIDINE GLUCONATE 0.12 MG/ML
15 RINSE ORAL ONCE
Status: COMPLETED | OUTPATIENT
Start: 2022-05-16 | End: 2022-05-16

## 2022-05-16 RX ORDER — PROPOFOL 10 MG/ML
INJECTION, EMULSION INTRAVENOUS CONTINUOUS PRN
Status: DISCONTINUED | OUTPATIENT
Start: 2022-05-16 | End: 2022-05-16

## 2022-05-16 RX ORDER — LIDOCAINE HYDROCHLORIDE 10 MG/ML
INJECTION, SOLUTION EPIDURAL; INFILTRATION; INTRACAUDAL; PERINEURAL AS NEEDED
Status: DISCONTINUED | OUTPATIENT
Start: 2022-05-16 | End: 2022-05-16

## 2022-05-16 RX ORDER — SODIUM CHLORIDE, SODIUM LACTATE, POTASSIUM CHLORIDE, CALCIUM CHLORIDE 600; 310; 30; 20 MG/100ML; MG/100ML; MG/100ML; MG/100ML
50 INJECTION, SOLUTION INTRAVENOUS CONTINUOUS
Status: DISCONTINUED | OUTPATIENT
Start: 2022-05-16 | End: 2022-05-16

## 2022-05-16 RX ORDER — GABAPENTIN 300 MG/1
600 CAPSULE ORAL 3 TIMES DAILY
Status: DISCONTINUED | OUTPATIENT
Start: 2022-05-16 | End: 2022-05-17 | Stop reason: HOSPADM

## 2022-05-16 RX ORDER — MAGNESIUM HYDROXIDE 1200 MG/15ML
LIQUID ORAL AS NEEDED
Status: DISCONTINUED | OUTPATIENT
Start: 2022-05-16 | End: 2022-05-16 | Stop reason: HOSPADM

## 2022-05-16 RX ORDER — OXYCODONE HYDROCHLORIDE 5 MG/1
10 TABLET ORAL EVERY 4 HOURS PRN
Status: DISCONTINUED | OUTPATIENT
Start: 2022-05-16 | End: 2022-05-17 | Stop reason: HOSPADM

## 2022-05-16 RX ORDER — CYCLOBENZAPRINE HCL 5 MG
5 TABLET ORAL 3 TIMES DAILY
Status: DISCONTINUED | OUTPATIENT
Start: 2022-05-16 | End: 2022-05-17 | Stop reason: HOSPADM

## 2022-05-16 RX ORDER — FOLIC ACID 1 MG/1
1000 TABLET ORAL DAILY
Status: DISCONTINUED | OUTPATIENT
Start: 2022-05-16 | End: 2022-05-16

## 2022-05-16 RX ORDER — PANTOPRAZOLE SODIUM 40 MG/1
40 TABLET, DELAYED RELEASE ORAL
Status: DISCONTINUED | OUTPATIENT
Start: 2022-05-16 | End: 2022-05-17 | Stop reason: HOSPADM

## 2022-05-16 RX ORDER — INSULIN LISPRO 100 [IU]/ML
1-6 INJECTION, SOLUTION INTRAVENOUS; SUBCUTANEOUS
Status: DISCONTINUED | OUTPATIENT
Start: 2022-05-16 | End: 2022-05-17 | Stop reason: HOSPADM

## 2022-05-16 RX ORDER — ONDANSETRON 2 MG/ML
4 INJECTION INTRAMUSCULAR; INTRAVENOUS EVERY 4 HOURS PRN
Status: DISCONTINUED | OUTPATIENT
Start: 2022-05-16 | End: 2022-05-17 | Stop reason: HOSPADM

## 2022-05-16 RX ORDER — HYDROMORPHONE HCL/PF 1 MG/ML
0.5 SYRINGE (ML) INJECTION
Status: DISCONTINUED | OUTPATIENT
Start: 2022-05-16 | End: 2022-05-16 | Stop reason: HOSPADM

## 2022-05-16 RX ORDER — FOLIC ACID 1 MG/1
1 TABLET ORAL DAILY
Status: DISCONTINUED | OUTPATIENT
Start: 2022-05-16 | End: 2022-05-17 | Stop reason: HOSPADM

## 2022-05-16 RX ORDER — LEVETIRACETAM 500 MG/1
1500 TABLET ORAL 2 TIMES DAILY
Status: DISCONTINUED | OUTPATIENT
Start: 2022-05-16 | End: 2022-05-17 | Stop reason: HOSPADM

## 2022-05-16 RX ORDER — FENTANYL CITRATE 50 UG/ML
INJECTION, SOLUTION INTRAMUSCULAR; INTRAVENOUS
Status: COMPLETED | OUTPATIENT
Start: 2022-05-16 | End: 2022-05-16

## 2022-05-16 RX ORDER — BUPIVACAINE HYDROCHLORIDE 5 MG/ML
INJECTION, SOLUTION PERINEURAL
Status: COMPLETED | OUTPATIENT
Start: 2022-05-16 | End: 2022-05-16

## 2022-05-16 RX ORDER — BISACODYL 10 MG
10 SUPPOSITORY, RECTAL RECTAL DAILY PRN
Status: DISCONTINUED | OUTPATIENT
Start: 2022-05-16 | End: 2022-05-17 | Stop reason: HOSPADM

## 2022-05-16 RX ORDER — SODIUM CHLORIDE, SODIUM LACTATE, POTASSIUM CHLORIDE, CALCIUM CHLORIDE 600; 310; 30; 20 MG/100ML; MG/100ML; MG/100ML; MG/100ML
75 INJECTION, SOLUTION INTRAVENOUS CONTINUOUS
Status: DISCONTINUED | OUTPATIENT
Start: 2022-05-16 | End: 2022-05-17 | Stop reason: HOSPADM

## 2022-05-16 RX ORDER — LORATADINE 10 MG/1
10 TABLET ORAL DAILY
Status: DISCONTINUED | OUTPATIENT
Start: 2022-05-16 | End: 2022-05-17 | Stop reason: HOSPADM

## 2022-05-16 RX ORDER — OXYCODONE HYDROCHLORIDE 5 MG/1
5 TABLET ORAL EVERY 4 HOURS PRN
Status: DISCONTINUED | OUTPATIENT
Start: 2022-05-16 | End: 2022-05-17 | Stop reason: HOSPADM

## 2022-05-16 RX ORDER — PRAVASTATIN SODIUM 80 MG/1
80 TABLET ORAL
Status: DISCONTINUED | OUTPATIENT
Start: 2022-05-16 | End: 2022-05-17 | Stop reason: HOSPADM

## 2022-05-16 RX ORDER — CHLORHEXIDINE GLUCONATE 4 G/100ML
SOLUTION TOPICAL DAILY PRN
Status: DISCONTINUED | OUTPATIENT
Start: 2022-05-16 | End: 2022-05-16

## 2022-05-16 RX ORDER — CALCIUM CARBONATE 200(500)MG
1000 TABLET,CHEWABLE ORAL DAILY PRN
Status: DISCONTINUED | OUTPATIENT
Start: 2022-05-16 | End: 2022-05-17 | Stop reason: HOSPADM

## 2022-05-16 RX ORDER — CEFAZOLIN SODIUM 2 G/50ML
2000 SOLUTION INTRAVENOUS ONCE
Status: COMPLETED | OUTPATIENT
Start: 2022-05-16 | End: 2022-05-16

## 2022-05-16 RX ORDER — DIAZEPAM 5 MG/1
5 TABLET ORAL EVERY 8 HOURS PRN
Status: DISCONTINUED | OUTPATIENT
Start: 2022-05-16 | End: 2022-05-17 | Stop reason: HOSPADM

## 2022-05-16 RX ORDER — METFORMIN HYDROCHLORIDE 500 MG/1
1000 TABLET, EXTENDED RELEASE ORAL
Status: DISCONTINUED | OUTPATIENT
Start: 2022-05-16 | End: 2022-05-17 | Stop reason: HOSPADM

## 2022-05-16 RX ORDER — BUPIVACAINE HYDROCHLORIDE 7.5 MG/ML
INJECTION, SOLUTION INTRASPINAL AS NEEDED
Status: DISCONTINUED | OUTPATIENT
Start: 2022-05-16 | End: 2022-05-16

## 2022-05-16 RX ORDER — MIDAZOLAM HYDROCHLORIDE 2 MG/2ML
INJECTION, SOLUTION INTRAMUSCULAR; INTRAVENOUS AS NEEDED
Status: DISCONTINUED | OUTPATIENT
Start: 2022-05-16 | End: 2022-05-16

## 2022-05-16 RX ORDER — PROPOFOL 10 MG/ML
INJECTION, EMULSION INTRAVENOUS AS NEEDED
Status: DISCONTINUED | OUTPATIENT
Start: 2022-05-16 | End: 2022-05-16

## 2022-05-16 RX ORDER — FENTANYL CITRATE 50 UG/ML
INJECTION, SOLUTION INTRAMUSCULAR; INTRAVENOUS AS NEEDED
Status: DISCONTINUED | OUTPATIENT
Start: 2022-05-16 | End: 2022-05-16

## 2022-05-16 RX ORDER — FERROUS SULFATE 325(65) MG
325 TABLET ORAL 2 TIMES DAILY WITH MEALS
Status: DISCONTINUED | OUTPATIENT
Start: 2022-05-16 | End: 2022-05-17 | Stop reason: HOSPADM

## 2022-05-16 RX ORDER — ENOXAPARIN SODIUM 100 MG/ML
30 INJECTION SUBCUTANEOUS EVERY 12 HOURS SCHEDULED
Status: DISCONTINUED | OUTPATIENT
Start: 2022-05-16 | End: 2022-05-17 | Stop reason: HOSPADM

## 2022-05-16 RX ORDER — ALLOPURINOL 100 MG/1
200 TABLET ORAL DAILY
Status: DISCONTINUED | OUTPATIENT
Start: 2022-05-16 | End: 2022-05-17 | Stop reason: HOSPADM

## 2022-05-16 RX ORDER — SIMETHICONE 80 MG
80 TABLET,CHEWABLE ORAL 4 TIMES DAILY PRN
Status: DISCONTINUED | OUTPATIENT
Start: 2022-05-16 | End: 2022-05-17 | Stop reason: HOSPADM

## 2022-05-16 RX ORDER — CEFAZOLIN SODIUM 2 G/50ML
2000 SOLUTION INTRAVENOUS EVERY 8 HOURS
Status: COMPLETED | OUTPATIENT
Start: 2022-05-16 | End: 2022-05-16

## 2022-05-16 RX ORDER — MIDAZOLAM HYDROCHLORIDE 2 MG/2ML
INJECTION, SOLUTION INTRAMUSCULAR; INTRAVENOUS
Status: COMPLETED | OUTPATIENT
Start: 2022-05-16 | End: 2022-05-16

## 2022-05-16 RX ADMIN — OXYCODONE HYDROCHLORIDE AND ACETAMINOPHEN 500 MG: 500 TABLET ORAL at 17:44

## 2022-05-16 RX ADMIN — CHLORHEXIDINE GLUCONATE 15 ML: 1.2 SOLUTION ORAL at 06:57

## 2022-05-16 RX ADMIN — PANTOPRAZOLE SODIUM 40 MG: 40 TABLET, DELAYED RELEASE ORAL at 12:38

## 2022-05-16 RX ADMIN — PRAVASTATIN SODIUM 80 MG: 80 TABLET ORAL at 16:10

## 2022-05-16 RX ADMIN — ACETAMINOPHEN 975 MG: 325 TABLET ORAL at 20:53

## 2022-05-16 RX ADMIN — OXYCODONE HYDROCHLORIDE AND ACETAMINOPHEN 500 MG: 500 TABLET ORAL at 12:38

## 2022-05-16 RX ADMIN — LORATADINE 10 MG: 10 TABLET ORAL at 16:10

## 2022-05-16 RX ADMIN — TRANEXAMIC ACID 1000 MG: 1 INJECTION, SOLUTION INTRAVENOUS at 08:00

## 2022-05-16 RX ADMIN — BUPIVACAINE HYDROCHLORIDE 10 ML: 5 INJECTION, SOLUTION PERINEURAL at 08:26

## 2022-05-16 RX ADMIN — SODIUM CHLORIDE, SODIUM LACTATE, POTASSIUM CHLORIDE, AND CALCIUM CHLORIDE 50 ML/HR: .6; .31; .03; .02 INJECTION, SOLUTION INTRAVENOUS at 06:58

## 2022-05-16 RX ADMIN — SENNOSIDES 8.6 MG: 8.6 TABLET, FILM COATED ORAL at 12:38

## 2022-05-16 RX ADMIN — CEFAZOLIN SODIUM 2000 MG: 2 SOLUTION INTRAVENOUS at 08:43

## 2022-05-16 RX ADMIN — MIDAZOLAM HYDROCHLORIDE 2 MG: 2 INJECTION, SOLUTION INTRAMUSCULAR; INTRAVENOUS at 08:26

## 2022-05-16 RX ADMIN — CYCLOBENZAPRINE HYDROCHLORIDE 5 MG: 5 TABLET, FILM COATED ORAL at 20:53

## 2022-05-16 RX ADMIN — ALLOPURINOL 200 MG: 100 TABLET ORAL at 16:10

## 2022-05-16 RX ADMIN — METFORMIN ER 500 MG 1000 MG: 500 TABLET ORAL at 16:10

## 2022-05-16 RX ADMIN — LEVETIRACETAM 1500 MG: 500 TABLET, FILM COATED ORAL at 17:44

## 2022-05-16 RX ADMIN — CEFAZOLIN SODIUM 2000 MG: 2 SOLUTION INTRAVENOUS at 18:32

## 2022-05-16 RX ADMIN — BUPIVACAINE 20 ML: 13.3 INJECTION, SUSPENSION, LIPOSOMAL INFILTRATION at 08:26

## 2022-05-16 RX ADMIN — OXYCODONE HYDROCHLORIDE 5 MG: 5 TABLET ORAL at 12:29

## 2022-05-16 RX ADMIN — GABAPENTIN 600 MG: 300 CAPSULE ORAL at 16:10

## 2022-05-16 RX ADMIN — OXYCODONE HYDROCHLORIDE 5 MG: 5 TABLET ORAL at 20:54

## 2022-05-16 RX ADMIN — Medication 1 TABLET: at 16:10

## 2022-05-16 RX ADMIN — CYCLOBENZAPRINE HYDROCHLORIDE 5 MG: 5 TABLET, FILM COATED ORAL at 12:38

## 2022-05-16 RX ADMIN — DOCUSATE SODIUM 100 MG: 100 CAPSULE, LIQUID FILLED ORAL at 17:45

## 2022-05-16 RX ADMIN — ONDANSETRON 4 MG: 2 INJECTION INTRAMUSCULAR; INTRAVENOUS at 08:45

## 2022-05-16 RX ADMIN — TRAMADOL HYDROCHLORIDE 50 MG: 50 TABLET ORAL at 17:44

## 2022-05-16 RX ADMIN — FENTANYL CITRATE 50 MCG: 50 INJECTION, SOLUTION INTRAMUSCULAR; INTRAVENOUS at 08:39

## 2022-05-16 RX ADMIN — GABAPENTIN 600 MG: 300 CAPSULE ORAL at 20:53

## 2022-05-16 RX ADMIN — SODIUM CHLORIDE, SODIUM LACTATE, POTASSIUM CHLORIDE, AND CALCIUM CHLORIDE 75 ML/HR: .6; .31; .03; .02 INJECTION, SOLUTION INTRAVENOUS at 20:53

## 2022-05-16 RX ADMIN — FOLIC ACID 1 MG: 1 TABLET ORAL at 12:38

## 2022-05-16 RX ADMIN — LIDOCAINE HYDROCHLORIDE 5 ML: 10 INJECTION, SOLUTION EPIDURAL; INFILTRATION; INTRACAUDAL at 08:44

## 2022-05-16 RX ADMIN — SODIUM CHLORIDE, SODIUM LACTATE, POTASSIUM CHLORIDE, AND CALCIUM CHLORIDE: .6; .31; .03; .02 INJECTION, SOLUTION INTRAVENOUS at 10:20

## 2022-05-16 RX ADMIN — MIDAZOLAM 2 MG: 1 INJECTION INTRAMUSCULAR; INTRAVENOUS at 08:20

## 2022-05-16 RX ADMIN — FENTANYL CITRATE 50 MCG: 50 INJECTION, SOLUTION INTRAMUSCULAR; INTRAVENOUS at 08:20

## 2022-05-16 RX ADMIN — FERROUS SULFATE TAB 325 MG (65 MG ELEMENTAL FE) 325 MG: 325 (65 FE) TAB at 16:10

## 2022-05-16 RX ADMIN — DOCUSATE SODIUM 100 MG: 100 CAPSULE, LIQUID FILLED ORAL at 12:38

## 2022-05-16 RX ADMIN — DEXAMETHASONE SODIUM PHOSPHATE 10 MG: 10 INJECTION, SOLUTION INTRAMUSCULAR; INTRAVENOUS at 08:45

## 2022-05-16 RX ADMIN — FENTANYL CITRATE 50 MCG: 50 INJECTION, SOLUTION INTRAMUSCULAR; INTRAVENOUS at 08:26

## 2022-05-16 RX ADMIN — CYCLOBENZAPRINE HYDROCHLORIDE 5 MG: 5 TABLET, FILM COATED ORAL at 16:10

## 2022-05-16 RX ADMIN — PROPOFOL 80 MCG/KG/MIN: 10 INJECTION, EMULSION INTRAVENOUS at 08:45

## 2022-05-16 RX ADMIN — CEFAZOLIN SODIUM 2000 MG: 2 SOLUTION INTRAVENOUS at 11:54

## 2022-05-16 RX ADMIN — ACETAMINOPHEN 975 MG: 325 TABLET ORAL at 12:38

## 2022-05-16 RX ADMIN — BUPIVACAINE HYDROCHLORIDE IN DEXTROSE 2 ML: 7.5 INJECTION, SOLUTION SUBARACHNOID at 08:42

## 2022-05-16 RX ADMIN — PROPOFOL 100 MG: 10 INJECTION, EMULSION INTRAVENOUS at 08:45

## 2022-05-16 RX ADMIN — ENOXAPARIN SODIUM 30 MG: 100 INJECTION SUBCUTANEOUS at 23:14

## 2022-05-16 NOTE — DISCHARGE INSTRUCTIONS
Dr Umang Andujar MD  Department of [address] [place] [address]  [address] , [address] [facility], [address] [place] [address]  [phone]                                        PATIENT INSTRUCTIONS AFTER TOTAL KNEE REPLACEMENT/REVISION    Diet: You may resume your normal diet  It is important to maintain a healthy, balanced diet  Include plenty of fluids, as pain medicines tend to cause constipation  Medications  Pain Medications: A prescription for pain medication has been provided to upon your hospital discharge  Your goal is reduce your pain medication gradually over the next 4-6 weeks  Take your pain medicine with food to avoid stomach discomfort  Please allow at least 24-48 hours (Monday through Friday) from the time of your request to the time a will need the prescription  Constipation: To avoid constipation, take an over-the-counter stool softener (i e  docusate sodium) twice daily such as docusate sodium or Senna S twice daily and Miralax laxative once daily while on pain medication  If the combination does not alleviate your symptoms after 3 days, use a rectal suppository such as dulcolax  Blood Clot Prevention as below:   Aspirin 325 mg TWICE daily x 6 weeks                   Activity  Rest Periods: Gradually increase your activity on a daily basis  The amount of time you spend out of bed and the number and distance of your walks should gradually increase each day  Between activities such as walking, meals, exercises, etc , take a rest period for approximately 1 hour in the morning, afternoon and evening  Limit sitting to 1 hour intervals for the next 4 - 6 weeks  Weight-bearing: You may put as much weight as is comfortable on your operative leg with activity  Use a walker or crutches while walking for at least 3 weeks  At that time, it is advised to use a cane until you are able to apply full weight to the operated leg    Impact Loading: Low-impact activities such as golf, stationary bicycling and slow dancing may begin in 6 weeks, while swimming is allowed at 4 weeks after surgery  All activities involving quick starts and stops, or impact loading, such as tennis, should be avoided to lower your risk of early loosening of the prosthesis  Bathing: No baths or shower until seen for first post op appointment  May sponge bathe  No creams or ointments on the incision for 4 weeks  Driving: You should not drive until approximately 4 weeks after surgery  While you are traveling as a passenger for the first 4 weeks following surgery, it is advised that you get out of the car at least hourly and take a short walk  Returning to work: The decision to return to work will be based on the type of work you do, your physical stamina, and whether you have other medical conditions  We recommend that you avoid making any major changes in your work or longterm plans until your recovery is complete  Wound Care  Keep dressing clean, dry and intact until follow up appointment  If dressing becomes dirty or saturated, you may replace with gauze and tape  No creams or ointments on the incision for 4 weeks  Your incision may be warm, itchy, and slightly red for several weeks after surgery  Excessive redness, soreness, or drainage from the incision area should be reported to our office  Common Problems  Leg & ankle swelling: You may have some swelling in your operated leg that should gradually decrease  If swelling occurs, lie down, elevate your legs, and rest   Pain:  Pain may be a result of over-activity  When you are in pain, sit or lie down, elevated your legs, and rest   If the pain does not subside, take the pain medication prescribed for you  Pain is a protective mechanism that helps to prevent over-usage and should not be ignored  Return Appointments: You should have a scheduled appointment for followup    If you do not already have a followup appointment scheduled, please call the office at [phone] to schedule an appointment  Call our office if you have:  Temperature of 101o or higher  Drainage from your incision  Increasing redness around your incision  Increasing  pain around the incision, unrelieved by pain medication  Excessive calf or thigh pain & swelling that does not go away with elevation and rest      ANTIBIOTIC PROPHYLAXIS AFTER TOTAL JOINT REPLACEMENT  For protection against the remote possibility of blood-borne bacteria, carried from the mouth during a dental procedure, creating an infection in a total joint replacement, a combined task force of [employer] and the [address] [place] [address] has made the following guideline recommendations: Following total joint replacement, all patients are advised to take an antibiotic regimen for the following dental procedures AS LIFETIME THERAPY:  Prophylactic cleaning of teeth or implants  Intraligamentary local anesthetic injections  Periodontal procedures  Root canal procedures  Dental extractions  Dental implant procedures  Implantation of avulsed teeth  Initial placement of orthodontic bands    The recommended antibiotic regimen (if not allergic to penicillin) is:  Amoxicillin, Cephalexin (e g  Keflex), or Cephradine two (2 0) grams orally one (1) hour prior to the dental procedure  For patients with a penicillin allergy, the recommended antibiotic is:  Clindamycin (e g  Cleocin) 600 mg orally one hour prior to the dental procedure  Antibiotic prophylaxis is not warranted for dental procedures for patients with previously placed orthopedic pins, plates or screws  The above recommendations are considered minimum guidelines  Your doctor and/or dentist are ultimately responsible for making individual treatment recommendations to you based on their clinical judgment

## 2022-05-16 NOTE — ANESTHESIA PROCEDURE NOTES
Peripheral Block    Patient location during procedure: holding area  Start time: 5/16/2022 8:26 AM  Reason for block: procedure for pain, at surgeon's request and post-op pain management  Staffing  Performed: Anesthesiologist   Anesthesiologist: Tiffanie Cha MD  Preanesthetic Checklist  Completed: patient identified, IV checked, site marked, risks and benefits discussed, surgical consent, monitors and equipment checked, pre-op evaluation and timeout performed  Peripheral Block  Patient position: supine  Prep: ChloraPrep  Patient monitoring: heart rate, cardiac monitor, continuous pulse ox and frequent blood pressure checks  Block type: adductor canal block  Laterality: right  Injection technique: single-shot  Procedures: ultrasound guided, Ultrasound guidance required for the procedure to increase accuracy and safety of medication placement and decrease risk of complications  Ultrasound permanent image savedbupivacaine (MARCAINE) 0 5 % - Perineural   10 mL - 5/16/2022 8:26:00 AM  midazolam (VERSED) 2 mg/2 mL - Intravenous   2 mg - 5/16/2022 8:26:00 AM  fentaNYL 50 mcg/mL - Intravenous   50 mcg - 5/16/2022 8:26:00 AM (exparel 20 cc)  Needle  Needle type: Stimuplex   Needle gauge: 26 G (4 cm)  Needle length: 4 cm    Needle localization: ultrasound guidance  Needle insertion depth: 3 cm  Test dose: negative  Assessment  Injection assessment: incremental injection, local visualized surrounding nerve on ultrasound and no paresthesia on injection  Paresthesia pain: none  Heart rate change: no  Slow fractionated injection: yes  Post-procedure:  site cleaned  patient tolerated the procedure well with no immediate complications  Additional Notes  Right adductor canal block - 20 cc exparel, 10 cc 0 5% bupiv  Ultrasound guidance  Ultrasound pic in MEDIA section of EPIC

## 2022-05-16 NOTE — CASE MANAGEMENT
Case Management Assessment & Discharge Planning Note    Patient name Laly Núñez  Location [facility] [id number] MRN [id number]  : 1960 Date 2022       Current Admission Date: 2022  Current Admission Diagnosis:Primary osteoarthritis of right knee   Patient Active Problem List    Diagnosis Date Noted    Primary localized osteoarthritis of left knee 2022    Effusion of left knee 2022    Aftercare following surgery of the musculoskeletal system 2019    Primary osteoarthritis of right knee 2019    Effusion of right knee 2019      LOS (days): 0  Geometric Mean LOS (GMLOS) (days):   Days to GMLOS:     OBJECTIVE:              Current admission status: Outpatient Surgery       Preferred Pharmacy:   [facility]/pharmacy [address] [place] [address]  Phone: [phone] Fax: [phone]    Primary Care Provider: Richa Hurd MD    Primary Insurance: BLUE CROSS  Secondary Insurance:     ASSESSMENT:  Active Health Care Proxies    There are no active Health Care Proxies on file  Advance Directives  Does patient have a [address] [place] [address] Avenue?: No  Was patient offered paperwork?: Yes (declined)  Does patient have Advance Directives?: No  Was patient offered paperwork?: Yes (declined)  Primary Contact: Chas Thomson - spouse         Readmission Root Cause  30 Day Readmission: No    Patient Information  Admitted from[de-identified] Home  Mental Status: Alert  During Assessment patient was accompanied by: Spouse  Assessment information provided by[de-identified] Patient, Spouse  Primary Caregiver: Self  Support Systems: Spouse/significant other, Daughter  [place] of Residence:  [facility] do you live in?: [address] entry access options  Select all that apply : Stairs  Number of steps to enter home  : 1  Do the steps have railings?: No  Type of Current Residence: 2 story home  Upon entering residence, is there a bedroom on the main floor (no further steps)?: No  A bedroom is located on the following floor levels of residence (select all that apply):: 2nd Floor  Upon entering residence, is there a bathroom on the main floor (no further steps)?: Yes  Number of steps to 2nd floor from main floor: One Flight  In the last 12 months, was there a time when you were not able to pay the mortgage or rent on time?: No  In the last 12 months, how many places have you lived?: 1  In the last 12 months, was there a time when you did not have a steady place to sleep or slept in a shelter (including now)?: No  Homeless/housing insecurity resource given?: N/A  Living Arrangements: Lives w/ Spouse/significant other  Is patient a ?: Yes  Is patient active with [facility])?: No    Activities of Daily Living Prior to Admission  Functional Status: Independent  Completes ADLs independently?: Yes  Ambulates independently?: Yes  Does patient use assisted devices?: No  Does patient currently own DME?: Yes  What DME does the patient currently own?: [employer]  Does patient have a history of Outpatient Therapy (PT/OT)?: Yes  Does the patient have a history of Short-Term Rehab?: No  Does patient have a history of HHC?: Yes  [facility] No         Patient Information Continued  Income Source: Employed  Does patient have prescription coverage?: Yes  Within the past 12 months, you worried that your food would run out before you got the money to buy more : Never true  Within the past 12 months, the food you bought just didn't last and you didn't have money to get more : Never true  Food insecurity resource given?: N/A  Does patient receive dialysis treatments?: No  Does patient have a history of substance abuse?: No  Does patient have a history of Mental Health Diagnosis?: No         Means of Transportation  Means of Transport to Appts[de-identified] Family transport  In the past 12 months, has lack of transportation kept you from medical appointments or from getting medications?: No  In the past 12 months, has lack of transportation kept you from meetings, work, or from getting things needed for daily living?: No  Was application for public transport provided?: N/A        DISCHARGE DETAILS:    Discharge planning discussed with[de-identified] patient and spouse  Freedom of Choice: Yes  Comments - Freedom of Choice: Patient plans on returning home at discharge and is planning to go to OP therapy - starting Thursday 5/19  CM contacted family/caregiver?: Yes  Were Treatment Team discharge recommendations reviewed with patient/caregiver?: Yes  Did patient/caregiver verbalize understanding of patient care needs?: Yes  Were patient/caregiver advised of the risks associated with not following Treatment Team discharge recommendations?: Yes    Contacts  Patient Contacts: Ilda Gibson - spouse  Relationship to Patient[de-identified] Family  Contact Method: In Person  Reason/Outcome: Continuity of Care, Emergency [address]         Is the patient interested in [facility] at discharge?: No    DME Referral Provided  Referral made for DME?: No              Treatment Team Recommendation: Home  Discharge Destination Plan[de-identified] Home  Transport at Discharge : Automobile, Family        Additional Comments: Patient lives with spouse in a 2 SH  He is independent adl's and ambulation, drives,works  Has OP therapy set up to start 5/19  Spouse to transport home

## 2022-05-16 NOTE — PHYSICAL THERAPY NOTE
PHYSICAL THERAPY EVAL/TX  Physical Therapy Evaluation    Performed at least 2 patient identifiers during session:  Patient Active Problem List   Diagnosis    Primary osteoarthritis of right knee    Effusion of right knee    Aftercare following surgery of the musculoskeletal system    Primary localized osteoarthritis of left knee    Effusion of left knee       Past Medical History:   Diagnosis Date    Arthritis     CPAP (continuous positive airway pressure) dependence     Diabetes mellitus (HCC)     Gout     Hyperlipidemia     Hypertension     Peripheral neuropathy     PONV (postoperative nausea and vomiting)     Rheumatoid arthritis (HCC)     Seizures (HCC)     Sleep apnea        Past Surgical History:   Procedure Laterality Date    COLONOSCOPY      HAND SURGERY Left 02/2019    KNEE ARTHROSCOPY Right     x 2    NE KNEE SCOPE,MED/LAT MENISECTOMY Right 8/9/2019    Procedure: ARTHROSCOPY KNEE, RIGHT PARTIAL MEDIAL MENISCECTOMY,RESECTION ANTEROMEDIAL PLICA;  Surgeon: Chaparro Jorgensen MD;  Location:  MAIN OR;  Service: Orthopedics            05/16/22 1346   PT Last Visit   PT Visit Date 05/16/22   Note Type   Note type Evaluation   Pain Assessment   Pain Assessment Tool 0-10   Pain Score 4   Pain Location/Orientation Orientation: Right;Location: Knee   Restrictions/Precautions   Weight Bearing Precautions Per Order Yes   RLE Weight Bearing Per Order WBAT   Other Precautions Pain; Fall Risk;Telemetry;Multiple lines;WBS   Home Living   Type of [address] [facility] Two level  (1STE)   Home Equipment Walker;Cane   Prior Function   Level of [place] Independent with ADLs and functional mobility   Lives With Spouse   Receives Help From Family   ADL Assistance Independent   IADLs Needs assistance   Falls in the last 6 months 0   Vocational Full time employment   General   Family/Caregiver Present Yes   Cognition   Overall Cognitive Status [facility]   Arousal/Participation Alert   Orientation Level Oriented X4   Memory Within functional limits   Following Commands Follows all commands and directions without difficulty   RLE Assessment   RLE Assessment   (Limited by bandage and pain)   LLE Assessment   LLE Assessment WFL   Light Touch   RLE Light Touch Grossly intact   LLE Light Touch Grossly intact   Bed Mobility   Supine to Sit 5  Supervision   Additional items HOB elevated; Bedrails; Increased time required;Verbal cues   Additional Comments Sitting edge of bed for approx 4 minutes with supervision  BP taken-stable  Transfers   Sit to Stand 4  Minimal assistance   Additional items Assist x 1; Armrests; Increased time required;Verbal cues   Stand to Sit 4  Minimal assistance   Additional items Assist x 1; Armrests; Increased time required;Verbal cues   Additional Comments Stood for approx 2 minutes for BP  BP stable  Balance   Static Sitting Normal   Dynamic Sitting Good   Static Standing Fair +   Dynamic Standing Fair +   Ambulatory Fair +   Endurance Deficit   Endurance Deficit Yes   Endurance Deficit Description Limited by pain   Activity Tolerance   Activity Tolerance Patient limited by pain   Medical Staff Made Aware Co-treat with OT due to medical complexity   Nurse Made Aware RN   Assessment   Prognosis Good   Problem List Decreased strength;Decreased range of motion;Decreased endurance; Impaired balance;Decreased mobility;Pain;Orthopedic restrictions   Assessment Patient is a 59y/o M who presented with R knee OA now s/p POD 0 R TKA  Resides with spouse in a [address] with 1 ORIANA  Bedroom/bathroom on 2nd floor  Was independent prior to admission  Current medical status includes multiple lines, telemetry, pain, fall risk, dizziness, decreased ROM, strength, balance, endurance and mobility  Patient tolerated session well  BP stable despite +dizziness in stance  Patient required min A/CG for all mobility   Verbal cues and demonstration provided for mobility techniques  Patient is mostly limited by pain at this time  Educated on importance of OOB activity during hospital stay  Plan for stair training next session if appropriate  Patient will benefit from outpatient P T  at discharge  The patient's AM-PAC Basic Mobility Inpatient Short Form Raw Score is 18  A Raw score of greater than 17 suggests the patient may benefit from discharge to home  Please also refer to the recommendation of the Physical Therapist for safe discharge planning  Barriers to Discharge Inaccessible home environment   Barriers to Discharge Comments Steps to the second floor for the bedroom/bathroom   Goals   Patient Goals To get better and go home   STG Expiration Date 05/23/22   Short Term Goal #1 1  perform supine<>sit with HOB flat without the use of bedrails ind 2  perform sit<>stand transfers mod I 3  Amb 200ft with RW at a mod I level 4  Ascend/descend 12 stairs with supervision with railing and nonreciprocal pattern   Plan   Treatment/Interventions Functional transfer training;LE strengthening/ROM; Elevations; Therapeutic exercise; Endurance training;Patient/family training;Equipment eval/education; Bed mobility;Gait training;Spoke to nursing;OT   PT Frequency Twice a day   Recommendation   PT Discharge Recommendation Home with outpatient rehabilitation   Additional Comments 2 Patient owns a RW   AM-[facility] Basic Mobility Inpatient   Turning in Bed Without Bedrails 4   Lying on Back to Sitting on Edge of Flat Bed 3   Moving Bed to Chair 3   Standing Up From Chair 3   Walk in Room 3   Climb 3-5 Stairs 2   Basic Mobility Inpatient Raw Score 18   Basic Mobility Standardized Score 41 05   Highest Level Of Mobility   JH-HLM Goal 6: Walk 10 steps or more   JH-HLM Achieved 7: Walk 25 feet or more   Additional Treatment Session   Start Time 1330   End Time 1346   Treatment Assessment Amb 75ft with RW and min A for CG  Step to pattern with decreased stance on the R  Decreased heel strike on the R   Antalgic gait  Stand to sit transfer with min A for CG  Verbal cues for technique  Sit to supine with supervision with HOB elevated and with use of bedrails  Equipment Use RW   End of Consult   Patient Position at End of Consult Supine; All needs within reach      Natalia Goodwin PT            Patient Name: Logan Roldan Date: 5/16/2022

## 2022-05-16 NOTE — ANESTHESIA PROCEDURE NOTES
Spinal Block    Patient location during procedure: OR  Start time: 5/16/2022 8:42 AM  Reason for block: procedure for pain, at surgeon's request and primary anesthetic  Staffing  Performed: CRNA   Anesthesiologist: Joanne Christianson MD  Resident/CRNA: Efren Beltre CRNA  Preanesthetic Checklist  Completed: patient identified, IV checked, site marked, risks and benefits discussed, surgical consent, monitors and equipment checked, pre-op evaluation and timeout performed  Spinal Block  Patient position: sitting  Prep: ChloraPrep  Patient monitoring: cardiac monitor, frequent blood pressure checks, heart rate and continuous pulse ox  Approach: midline  Location: L3-4  Injection technique: single-shot  Needle  Needle type: pencil-tip   Needle gauge: 25 G  Needle length: 10 cm  Assessment  Sensory level: T10  Injection Assessment:  negative aspiration for heme, no paresthesia on injection and positive aspiration for clear CSF    Post-procedure:  site cleaned  Additional Notes  One attempt, L3-4, positive CSF, negative heme  2 cc 0 75% hyperbaric bupivacaine  VSS

## 2022-05-16 NOTE — OP NOTE
OPERATIVE REPORT  PATIENT NAME: Manpreet Dubon    :  1960  MRN: [id number]  Pt Location:  OR ROOM 01    SURGERY DATE: 2022    Surgeon(s) and Role:     * Nima Orr MD - Primary    Preop Diagnosis:  Primary osteoarthritis of right knee [M17 11]    Post-Op Diagnosis Codes:     * Primary osteoarthritis of right knee [M17 11]    Procedure(s) (LRB):  ARTHROPLASTY KNEE TOTAL (Right)    Specimen(s):  * No specimens in log *    Estimated Blood Loss:   Minimal    Drains:  * No LDAs found *    Anesthesia Type:   Spinal, Adductor canal block    Operative Indications:  Primary osteoarthritis of right knee [M17 11]    TT: 88 mins    Prosthesis: Depuy Attune+ posterior cruciate-sacrificing femur size 7, rotating tibia size 7, Patella 38, with 6 mm mobile spacer  Indications: Manpreet Dubon is a 58y o  years old male diagnosed with right knee osteoarthritis  Patient failed conservative treatments and elected to proceed with surgical intervention  The risks and complications are discussed with the patient  The patient consented to the procedure  Procedure: Patient was brought into the OR and placed in supine position  Patient was anethetized with spinal  Patient also had adductor canal block done in the holding area  Patient's right knee was prepped and draped in sterile fashion with tourniquet in the upper thigh  A time out was call and identified the right knee was the operating site  Patient's preop right knee range of motion 15 - 110  The leg was then exsanguinated with Esmarch  The tourniquet is inflated to 250 mmHg  A longitudinal incision was made center over the right knee  Dissection was then carried down to the extensor mechanism  A medial parapatellar arthrotomy was done to gain access into the knee joint  The patient appeared to have grade 4 changes in PF and medial compartments  The meniscus were removed  The anterior fat was also excised  The ACL and PCL were detached and excised   The tibia was then subluxed anteriorly with a retractor  An external tibia cutting guide is used to carried out the proximal tibia osteotomy  Care was make sure the external cutting guide was lined up with tibia anteriorly and also to the 2nd metatarsal with 3 degree of posterior sloping  Once the tibia cut was complete, the attention was then turn to the distal femur  An intramedullary cutting guide was used to perform the distal femoral cut  An extension block was use to assess the soft tissue balance with knee in full extension, which appears to be satisfactory with a 6 mm spacer  The distal femur was then sized and 2 pin were inserted to set the femoral component rotation  The flexion gap was assessed which appeared to have good balance  The #7 4-in-1 and notch cutting guide were used to complete the distal cut  The size 7 femoral trial was then placed onto the distal femur, which appears to have good fit with no lateral or medial overriding  Attention was then turned to the tibia, which was prepared with the #7 tibial tray in slight external rotation  With a 6 mm trial spacer placed, the knee had good stability in mid-flexion and full extension  The patella was prepared to accommodate a size 38 button  A trial button was then used  Range motion of the knee was then done  The patella appeared to be gliding nicely without tilting or subluxing  The trials were removed and the bony surface was irrigated with Irrisept solution for 1 minute  The Aquamantys was used to treat the posterior capsule  30 cc of Duramorph solution was injected into the posterior, medial and lateral soft tissue  Once the bone surface is dried up and all the components were open  The cement was mixed and final prosthesis was then placed onto the distal femur, proximal tibia, and patella with cement  Excess cement was removed  Once the cement was harden, the tourniquet was let down  There were no active arterial bleeding   The venous bleeding was control with electro-Bovie and Aquamantys  The extensor mechanism was then repaired with #1 Vicryl in an interrupted fashion  Layered closure was carried out with 0 Vicryl and 2-0 Vicryl, and Stratafix was used to close the skin  Patient's post-op right knee range of motion 0 -130  Steri-strips were then applied with a sterile bulky dressing  The patient tolerated the procedure well without any complications  Patient was transferred to recovery room for post-op care  The family was contacted  There was no qualified resident available to assist     Mrs Shore Consuelo was required in the OR in helping retracting, exposing the joint, and placement of the knee prosthesis      Complications:   None    Patient Disposition:  PACU     SIGNATURE: Tish Lucas MD  DATE: May 16, 2022  TIME: 10:59 AM

## 2022-05-16 NOTE — PLAN OF CARE
Problem: PHYSICAL THERAPY ADULT  Goal: Performs mobility at highest level of function for planned discharge setting  See evaluation for individualized goals  Description: Treatment/Interventions: Functional transfer training, LE strengthening/ROM, Elevations, Therapeutic exercise, Endurance training, Patient/family training, Equipment eval/education, Bed mobility, Gait training, Spoke to nursing, OT          See flowsheet documentation for full assessment, interventions and recommendations  Note: Prognosis: Good  Problem List: Decreased strength, Decreased range of motion, Decreased endurance, Impaired balance, Decreased mobility, Pain, Orthopedic restrictions  Assessment: Patient is a 57y/o M who presented with R knee OA now s/p POD 0 R TKA  Resides with spouse in a [facility] with 1 ORIANA  Bedroom/bathroom on 2nd floor  Was independent prior to admission  Current medical status includes multiple lines, telemetry, pain, fall risk, dizziness, decreased ROM, strength, balance, endurance and mobility  Patient tolerated session well  BP stable despite +dizziness in stance  Patient required min A/CG for all mobility  Verbal cues and demonstration provided for mobility techniques  Patient is mostly limited by pain at this time  Educated on importance of OOB activity during hospital stay  Plan for stair training next session if appropriate  Patient will benefit from outpatient P T  at discharge  The patient's AM-PAC Basic Mobility Inpatient Short Form Raw Score is 18  A Raw score of greater than 17 suggests the patient may benefit from discharge to home  Please also refer to the recommendation of the Physical Therapist for safe discharge planning  Barriers to Discharge: Inaccessible home environment  Barriers to Discharge Comments: Steps to the second floor for the bedroom/bathroom     PT Discharge Recommendation: Home with outpatient rehabilitation          See flowsheet documentation for full assessment

## 2022-05-16 NOTE — INTERVAL H&P NOTE
H&P reviewed  After examining the patient I find no changes in the patients condition since the H&P had been written      Vitals:    05/16/22 0650   BP: 139/73   Pulse: 82   Resp: 18   Temp: 97 8 °F (36 6 °C)   SpO2: 96%

## 2022-05-16 NOTE — PLAN OF CARE
Problem: PAIN - ADULT  Goal: Verbalizes/displays adequate comfort level or baseline comfort level  Description: Interventions:  - Encourage patient to monitor pain and request assistance  - Assess pain using appropriate pain scale  - Administer analgesics based on type and severity of pain and evaluate response  - Implement non-pharmacological measures as appropriate and evaluate response  - Consider cultural and social influences on pain and pain management  - Notify physician/advanced practitioner if interventions unsuccessful or patient reports new pain  Outcome: Progressing     Problem: INFECTION - ADULT  Goal: Absence or prevention of progression during hospitalization  Description: INTERVENTIONS:  - Assess and monitor for signs and symptoms of infection  - Monitor lab/diagnostic results  - Monitor all insertion sites, i e  indwelling lines, tubes, and drains  - Monitor endotracheal if appropriate and nasal secretions for changes in amount and color  - [place] appropriate cooling/warming therapies per order  - Administer medications as ordered  - Instruct and encourage patient and family to use good hand hygiene technique  - Identify and instruct in appropriate isolation precautions for identified infection/condition  Outcome: Progressing     Problem: SAFETY ADULT  Goal: Patient will remain free of falls  Description: INTERVENTIONS:  - Educate patient/family on patient safety including physical limitations  - Instruct patient to call for assistance with activity   - Consult OT/PT to assist with strengthening/mobility   - Keep Call bell within reach  - Keep bed low and locked with side rails adjusted as appropriate  - Keep care items and personal belongings within reach  - Initiate and maintain comfort rounds  - Make Fall Risk Sign visible to staff  - Offer Toileting every 2 Hours, in advance of need  - Initiate/Maintain bed alarm  - Obtain necessary fall risk management equipment:   - Apply yellow socks and bracelet for high fall risk patients  - Consider moving patient to room near nurses station  Outcome: Progressing  Goal: Maintain or return to baseline ADL function  Description: INTERVENTIONS:  -  Assess patient's ability to carry out ADLs; assess patient's baseline for ADL function and identify physical deficits which impact ability to perform ADLs (bathing, care of mouth/teeth, toileting, grooming, dressing, etc )  - Assess/evaluate cause of self-care deficits   - Assess range of motion  - Assess patient's mobility; develop plan if impaired  - Assess patient's need for assistive devices and provide as appropriate  - Encourage maximum independence but intervene and supervise when necessary  - Involve family in performance of ADLs  - Assess for home care needs following discharge   - Consider OT consult to assist with ADL evaluation and planning for discharge  - Provide patient education as appropriate  Outcome: Progressing  Goal: Maintains/Returns to pre admission functional level  Description: INTERVENTIONS:  - Perform BMAT or MOVE assessment daily    - Set and communicate daily mobility goal to care team and patient/family/caregiver  - Collaborate with rehabilitation services on mobility goals if consulted  - Perform Range of Motion 2 times a day  - Reposition patient every 2 hours    - Dangle patient 2 times a day  - Stand patient 2 times a day  - Ambulate patient 2 times a day  - Out of bed to chair 2 times a day   - Out of bed for meals 2 times a day  - Out of bed for toileting  - Record patient progress and toleration of activity level   Outcome: Progressing     Problem: DISCHARGE PLANNING  Goal: Discharge to home or other facility with appropriate resources  Description: INTERVENTIONS:  - Identify barriers to discharge w/patient and caregiver  - Arrange for needed discharge resources and transportation as appropriate  - Identify discharge learning needs (meds, wound care, etc )  - Arrange for interpretive services to assist at discharge as needed  - Refer to Case Management Department for coordinating discharge planning if the patient needs post-hospital services based on physician/advanced practitioner order or complex needs related to functional status, cognitive ability, or social support system  Outcome: Progressing     Problem: Knowledge Deficit  Goal: Patient/family/caregiver demonstrates understanding of disease process, treatment plan, medications, and discharge instructions  Description: Complete learning assessment and assess knowledge base    Interventions:  - Provide teaching at level of understanding  - Provide teaching via preferred learning methods  Outcome: Progressing

## 2022-05-16 NOTE — ANESTHESIA POSTPROCEDURE EVALUATION
Post-Op Assessment Note    CV Status:  Stable  Pain Score: 0    Pain management: adequate     Mental Status:  Alert and awake   Hydration Status:  Euvolemic   PONV Controlled:  Controlled   Airway Patency:  Patent      Post Op Vitals Reviewed: Yes      Staff: CRNA         No complications documented      /67 (05/16/22 1100)    Temp   97 4   Pulse 75 (05/16/22 1100)   Resp   16   SpO2   94

## 2022-05-16 NOTE — OCCUPATIONAL THERAPY NOTE
Occupational Therapy Evaluation     Patient Name: Rosy WALLACE'PITO Date: 5/16/2022  Problem List  Active Problems:    * No active hospital problems  *    Past Medical History  Past Medical History:   Diagnosis Date    Arthritis     CPAP (continuous positive airway pressure) dependence     Diabetes mellitus (HCC)     Gout     Hyperlipidemia     Hypertension     Peripheral neuropathy     PONV (postoperative nausea and vomiting)     Rheumatoid arthritis ([address] )     Seizures ([facility] [address] )     Sleep apnea      Past Surgical History  Past Surgical History:   Procedure Laterality Date    COLONOSCOPY      HAND SURGERY Left 02/2019    KNEE ARTHROSCOPY Right     x 2    MN KNEE SCOPE,MED/LAT MENISECTOMY Right 8/9/2019    Procedure: ARTHROSCOPY KNEE, RIGHT PARTIAL MEDIAL MENISCECTOMY,RESECTION ANTEROMEDIAL PLICA;  Surgeon: Tish Lucas MD;  Location:  MAIN OR;  Service: Orthopedics         05/16/22 1347   OT Last Visit   OT Visit Date 05/16/22   Note Type   Note type Evaluation   Restrictions/Precautions   Weight Bearing Precautions Per Order Yes   RLE Weight Bearing Per Order WBAT   Other Precautions Fall Risk;Pain;Telemetry;WBS   Pain Assessment   Pain Assessment Tool 0-10   Pain Score 4   Pain Location/Orientation Orientation: Right;Location: Knee   Patient's Stated Pain Goal No pain   Hospital Pain Intervention(s) Repositioned; Rest   Home Living   Type of [address] level;Stairs to enter with rails   Bathroom Shower/Tub Tub/shower unit   [facility] Walker;Cane  (not using at baseline)   Prior Function   Level of [place] Independent with ADLs and functional mobility   Lives With Spouse   Receives Help From Family   ADL Assistance Independent   IADLs Needs assistance   Falls in the last 6 months 0   Vocational Full time employment  ()   Subjective   Subjective Pt received in supine position  Pt agreeable to session   ADL   Eating Assistance 7  [address] 5  Supervision/Setup   LB Bathing Assistance 4  Minimal Assistance   UB Dressing Assistance 5  Supervision/Setup   LB Dressing Assistance 3  Moderate Assistance   Toileting Assistance  4  Minimal Assistance   Bed Mobility   Supine to Sit 5  Supervision   Additional items Verbal cues   Sit to Supine 5  Supervision   Additional items Verbal cues   Transfers   Sit to Stand 4  Minimal assistance   Additional items Assist x 1;Verbal cues   Stand to Sit 4  Minimal assistance   Additional items Assist x 1;Verbal cues   Functional Mobility   Functional Mobility 4  Minimal assistance   Additional Comments x 1, RW   Balance   Static Sitting Normal   Dynamic Sitting Good   Static Standing Fair +   Dynamic Standing Fair +   Activity Tolerance   Activity Tolerance Patient tolerated treatment well   Medical Staff Made Aware PT Natalia   Nurse Made Aware RN Allan Marie   RUE Assessment   RUE Assessment WFL   LUE Assessment   LUE Assessment WFL   Cognition   Overall Cognitive Status WFL   Arousal/Participation Alert   Attention Within functional limits   Orientation Level Oriented X4   Memory Within functional limits   Following Commands Follows all commands and directions without difficulty   Assessment   Limitation Decreased ADL status; Decreased self-care trans;Decreased high-level ADLs   Prognosis Good   Assessment Pt is a 58 y o  male seen for OT evaluation at [facility], admitted 5/16/2022 w/ right knee OA  Pt now s/p right TKA  OT completed expanded review of pt's medical and social history  Comorbidities affecting pt's functional performance at time of assessment include: left knee OA  Personal factors affecting pt at time of IE include:steps to enter environment, difficulty performing ADLS, difficulty performing IADLS  and decreased functional mobility   Prior to admission, pt was living with wife in house with steps to manage  Pt was I w/  ADLS and required some assist with IADLS  Pt required no DME at baseline  Worked full time  Upon evaluation: Pt requires supervision for bed mobility, min A x 1 for functional mobility/transfers, supervision for UB ADLs and min-mod A for LB ADLS 2* the following deficits impacting occupational performance: decreased strength, decreased balance, decreased tolerance and increased pain  Pt to benefit from continued skilled OT tx while in the hospital to address deficits as defined above and maximize level of functional independence w ADL's and functional mobility  Occupational Performance areas to address include: bathing/shower, toilet hygiene, functional mobility and job performance/volunteering  Based on findings, pt is of moderate complexity  The patient's raw score on the AM-PAC Daily Activity inpatient short form is 19, standardized score is 40 22, greater than 39 4  Patients at this level are likely to benefit from DC to home, which DOES coincide with CURRENT above OT recommendations  However please refer to therapist recommendation for discharge planning given other factors that may influence destination  At this time, OT recommendations at time of discharge are home with no OT needs  Will continue to benefit from PT services  Goals   Patient Goals Pt wishes to get better   Plan   Treatment Interventions ADL retraining;Functional transfer training;Patient/family training; Compensatory technique education;Equipment evaluation/education   Goal Expiration Date 05/26/22   OT Treatment Day 0   OT Frequency 2-3x/wk   Recommendation   OT Discharge Recommendation No rehabilitation needs   Equipment Recommended Shower/Tub chair with back ($)   AM-PAC Daily Activity Inpatient   Lower Body Dressing 2   Bathing 2   Toileting 3   Upper Body Dressing 4   Grooming 4   Eating 4   Daily Activity Raw Score 19   Daily Activity Standardized Score (Calc for Raw Score >=11) 40 22   AM-PAC Applied Cognition Inpatient   Following a Speech/Presentation 4   Understanding Ordinary Conversation 4   Taking Medications 4   Remembering Where Things Are Placed or Put Away 4   Remembering List of 4-5 Errands 4   Taking Care of Complicated Tasks 4   Applied Cognition Raw Score 24   Applied Cognition Standardized Score 62 21         Pt will achieve the following goals within 10 days  *Pt will complete UB bathing and dressing with mod I     *Pt will complete LB bathing and dressing with mod I      * Pt will complete toileting w/ mod I w/ G hygiene/thoroughness using DME PRN    *Pt will complete bed mobility with mod I, with bed flat and no side rail to prep for purposeful tasks    *Pt will perform functional transfers with on/off all surfaces with mod I using DME as needed w/ G balance/safety  *Pt will increase standing tolerance to 5 minutes in order to complete sinkside ADL task  *Pt will improve functional mobility during ADL/IADL/leisure tasks to mod I using DME as needed w/ G balance/safety         Zachary Story, OTR/L

## 2022-05-16 NOTE — PLAN OF CARE
Problem: OCCUPATIONAL THERAPY ADULT  Goal: Performs self-care activities at highest level of function for planned discharge setting  See evaluation for individualized goals  Description: Treatment Interventions: ADL retraining, Functional transfer training, Patient/family training, Compensatory technique education, Equipment evaluation/education  Equipment Recommended: Shower/Tub chair with back ($)       See flowsheet documentation for full assessment, interventions and recommendations  Note: Limitation: Decreased ADL status, Decreased self-care trans, Decreased high-level ADLs  Prognosis: Good  Assessment: Pt is a 58 y o  male seen for OT evaluation at [facility], admitted 5/16/2022 w/ right knee OA  Pt now s/p right TKA  OT completed expanded review of pt's medical and social history  Comorbidities affecting pt's functional performance at time of assessment include: left knee OA  Personal factors affecting pt at time of IE include:steps to enter environment, difficulty performing ADLS, difficulty performing IADLS  and decreased functional mobility  Prior to admission, pt was living with wife in house with steps to manage  Pt was I w/  ADLS and required some assist with IADLS  Pt required no DME at baseline  Worked full time  Upon evaluation: Pt requires supervision for bed mobility, min A x 1 for functional mobility/transfers, supervision for UB ADLs and min-mod A for LB ADLS 2* the following deficits impacting occupational performance: decreased strength, decreased balance, decreased tolerance and increased pain  Pt to benefit from continued skilled OT tx while in the hospital to address deficits as defined above and maximize level of functional independence w ADL's and functional mobility  Occupational Performance areas to address include: bathing/shower, toilet hygiene, functional mobility and job performance/volunteering  Based on findings, pt is of moderate complexity   The patient's raw score on the AM-PAC Daily Activity inpatient short form is 19, standardized score is 40 22, greater than 39 4  Patients at this level are likely to benefit from DC to home, which DOES coincide with CURRENT above OT recommendations  However please refer to therapist recommendation for discharge planning given other factors that may influence destination  At this time, OT recommendations at time of discharge are home with no OT needs  Will continue to benefit from PT services       OT Discharge Recommendation: No rehabilitation needs

## 2022-05-17 VITALS
DIASTOLIC BLOOD PRESSURE: 72 MMHG | HEIGHT: 71 IN | SYSTOLIC BLOOD PRESSURE: 112 MMHG | BODY MASS INDEX: 37.02 KG/M2 | TEMPERATURE: 97.7 F | WEIGHT: 264.4 LBS | OXYGEN SATURATION: 99 % | HEART RATE: 62 BPM | RESPIRATION RATE: 18 BRPM

## 2022-05-17 LAB
ANION GAP SERPL CALCULATED.3IONS-SCNC: 9 MMOL/L (ref 4–13)
BUN SERPL-MCNC: 21 MG/DL (ref 5–25)
CALCIUM SERPL-MCNC: 8.6 MG/DL (ref 8.3–10.1)
CHLORIDE SERPL-SCNC: 103 MMOL/L (ref 100–108)
CO2 SERPL-SCNC: 28 MMOL/L (ref 21–32)
CREAT SERPL-MCNC: 0.99 MG/DL (ref 0.6–1.3)
GFR SERPL CREATININE-BSD FRML MDRD: 81 ML/MIN/1.73SQ M
GLUCOSE SERPL-MCNC: 155 MG/DL (ref 65–140)
GLUCOSE SERPL-MCNC: 174 MG/DL (ref 65–140)
GLUCOSE SERPL-MCNC: 179 MG/DL (ref 65–140)
HCT VFR BLD AUTO: 34.6 % (ref 36.5–49.3)
HGB BLD-MCNC: 11.1 G/DL (ref 12–17)
POTASSIUM SERPL-SCNC: 4.7 MMOL/L (ref 3.5–5.3)
SODIUM SERPL-SCNC: 140 MMOL/L (ref 136–145)

## 2022-05-17 PROCEDURE — 80048 BASIC METABOLIC PNL TOTAL CA: CPT | Performed by: PHYSICIAN ASSISTANT

## 2022-05-17 PROCEDURE — 97110 THERAPEUTIC EXERCISES: CPT

## 2022-05-17 PROCEDURE — 97535 SELF CARE MNGMENT TRAINING: CPT

## 2022-05-17 PROCEDURE — 99024 POSTOP FOLLOW-UP VISIT: CPT | Performed by: ORTHOPAEDIC SURGERY

## 2022-05-17 PROCEDURE — 85014 HEMATOCRIT: CPT | Performed by: PHYSICIAN ASSISTANT

## 2022-05-17 PROCEDURE — 99225 PR SBSQ OBSERVATION CARE/DAY 25 MINUTES: CPT | Performed by: INTERNAL MEDICINE

## 2022-05-17 PROCEDURE — 97116 GAIT TRAINING THERAPY: CPT

## 2022-05-17 PROCEDURE — 82948 REAGENT STRIP/BLOOD GLUCOSE: CPT

## 2022-05-17 PROCEDURE — 85018 HEMOGLOBIN: CPT | Performed by: PHYSICIAN ASSISTANT

## 2022-05-17 RX ORDER — OXYCODONE HYDROCHLORIDE 5 MG/1
5 TABLET ORAL EVERY 4 HOURS PRN
Qty: 30 TABLET | Refills: 0 | Status: SHIPPED | OUTPATIENT
Start: 2022-05-17 | End: 2022-05-27

## 2022-05-17 RX ORDER — ACETAMINOPHEN 325 MG/1
975 TABLET ORAL EVERY 8 HOURS
Qty: 90 TABLET | Refills: 1 | Status: SHIPPED | OUTPATIENT
Start: 2022-05-17

## 2022-05-17 RX ORDER — ASPIRIN 325 MG
325 TABLET, DELAYED RELEASE (ENTERIC COATED) ORAL 2 TIMES DAILY
Qty: 90 TABLET | Refills: 0 | Status: SHIPPED | OUTPATIENT
Start: 2022-05-17

## 2022-05-17 RX ADMIN — SENNOSIDES 8.6 MG: 8.6 TABLET, FILM COATED ORAL at 07:53

## 2022-05-17 RX ADMIN — LORATADINE 10 MG: 10 TABLET ORAL at 07:53

## 2022-05-17 RX ADMIN — FERROUS SULFATE TAB 325 MG (65 MG ELEMENTAL FE) 325 MG: 325 (65 FE) TAB at 07:53

## 2022-05-17 RX ADMIN — INSULIN LISPRO 1 UNITS: 100 INJECTION, SOLUTION INTRAVENOUS; SUBCUTANEOUS at 11:48

## 2022-05-17 RX ADMIN — Medication 1 TABLET: at 07:53

## 2022-05-17 RX ADMIN — TRAMADOL HYDROCHLORIDE 50 MG: 50 TABLET ORAL at 05:16

## 2022-05-17 RX ADMIN — ENOXAPARIN SODIUM 30 MG: 100 INJECTION SUBCUTANEOUS at 07:53

## 2022-05-17 RX ADMIN — INSULIN LISPRO 1 UNITS: 100 INJECTION, SOLUTION INTRAVENOUS; SUBCUTANEOUS at 08:00

## 2022-05-17 RX ADMIN — GABAPENTIN 600 MG: 300 CAPSULE ORAL at 07:53

## 2022-05-17 RX ADMIN — TRAMADOL HYDROCHLORIDE 50 MG: 50 TABLET ORAL at 11:46

## 2022-05-17 RX ADMIN — ACETAMINOPHEN 975 MG: 325 TABLET ORAL at 05:16

## 2022-05-17 RX ADMIN — FOLIC ACID 1 MG: 1 TABLET ORAL at 07:53

## 2022-05-17 RX ADMIN — CYCLOBENZAPRINE HYDROCHLORIDE 5 MG: 5 TABLET, FILM COATED ORAL at 07:53

## 2022-05-17 RX ADMIN — LEVETIRACETAM 1500 MG: 500 TABLET, FILM COATED ORAL at 07:53

## 2022-05-17 RX ADMIN — PANTOPRAZOLE SODIUM 40 MG: 40 TABLET, DELAYED RELEASE ORAL at 07:53

## 2022-05-17 RX ADMIN — DOCUSATE SODIUM 100 MG: 100 CAPSULE, LIQUID FILLED ORAL at 07:53

## 2022-05-17 RX ADMIN — OXYCODONE HYDROCHLORIDE AND ACETAMINOPHEN 500 MG: 500 TABLET ORAL at 07:53

## 2022-05-17 RX ADMIN — ACETAMINOPHEN 975 MG: 325 TABLET ORAL at 11:46

## 2022-05-17 RX ADMIN — ALLOPURINOL 200 MG: 100 TABLET ORAL at 07:53

## 2022-05-17 NOTE — ASSESSMENT & PLAN NOTE
Patient is status post right total knee replacement for osteoarthritis  Patient walked today  Knee pain is controlled    He is medically stable for discharge today

## 2022-05-17 NOTE — PLAN OF CARE
Problem: PHYSICAL THERAPY ADULT  Goal: Performs mobility at highest level of function for planned discharge setting  See evaluation for individualized goals  Description: Treatment/Interventions: Functional transfer training, LE strengthening/ROM, Elevations, Therapeutic exercise, Endurance training, Equipment eval/education, Bed mobility, Gait training, Spoke to nursing          See flowsheet documentation for full assessment, interventions and recommendations  Outcome: Progressing  Note: Prognosis: Good  Problem List: Decreased strength, Decreased range of motion, Decreased mobility, Impaired balance, Pain  Assessment: Patient was received supine in bed and agreeable to session  Reports low pain levels and that he ambulated in the hallway yesterday  Patient tolerated session well  He improved this session and is not at a mod I/supervision level for all mobility  He increased his ambulation distance today and is progressing to a step through gait  Patient also able to complete a full flight of stairs with spouse present  Educated on home there-ex, use of ice and importance of ambulating at home  Recommending outpatient P T  The patient's AM-PAC Basic Mobility Inpatient Short Form Raw Score is 22  A Raw score of greater than 17 suggests the patient may benefit from discharge to home  Please also refer to the recommendation of the Physical Therapist for safe discharge planning  Barriers to Discharge: None  Barriers to Discharge Comments: Steps to the second floor for the bedroom/bathroom     PT Discharge Recommendation: Home with home health rehabilitation          See flowsheet documentation for full assessment

## 2022-05-17 NOTE — ASSESSMENT & PLAN NOTE
Patient has chronic hypertension  Lungs are clear to auscultation, heart is regular rhythm on discharge    Will continue lisinopril HCTZ discharge

## 2022-05-17 NOTE — OCCUPATIONAL THERAPY NOTE
Occupational Therapy Tx Note     Patient Name: Shashank Maguire  UBKKP'H Date: 5/17/2022  Problem List  Principal Problem:    Primary osteoarthritis of right knee  Active Problems:    Type 2 diabetes mellitus with diabetic neuropathy, without long-term current use of insulin (HCC)    Primary hypertension    Seizure disorder ([facility])    Rheumatoid arthritis ([facility] [address] )          05/17/22 1000   OT Last Visit   OT Visit Date 05/17/22   Note Type   Note Type Treatment   Restrictions/Precautions   Weight Bearing Precautions Per Order Yes   RLE Weight Bearing Per Order WBAT   Other Precautions Fall Risk;Pain   Pain Assessment   Pain Assessment Tool 0-10   Pain Score 2   Pain Location/Orientation Orientation: Right;Location: Knee   ADL   LB Dressing Assistance 3  Moderate Assistance   LB Dressing Deficit Don/doff R sock; Use of adaptive equipment  (compression stocking)   LB Dressing Comments Unsuccessful attempt made to use hard sock aide  Reviewed alternative options to maximize independence including soft sock aide and "doff N toro"  Bed Mobility   Additional Comments Pt received OOB  Transfers   Sit to Stand 6  Modified independent   Stand to Sit 6  Modified independent   Cognition   Overall Cognitive Status [facility]   Arousal/Participation Alert   Attention Within functional limits   Orientation Level Oriented X4   Memory Within functional limits   Following Commands Follows all commands and directions without difficulty   Assessment   Assessment Pt seen for OT tx session with focus on functional balance, functional mobility, ADL status, and transfer safety  Patient agreeable to OT treatment session  Pt received seated OOB to Recliner  Reviewed and practiced strategies for LB dressing including donning/doffing compression stockings  Discussed AD options to maximize independence  Pt aware how to privately purchase reviewed equipment at surgical supply store  Discussed appropriate DME for shower safety    Wife reports will assist as needed  Patient continues to be functioning below baseline level, occupational performance remains limited secondary to factors listed above, and pt at increased risk for falls and injury  The patient's raw score on the AM-PAC Daily Activity inpatient short form is 21, standardized score is 44 27, greater than 39 4  Patients at this level are likely to benefit from DC to home  Please refer to the recommendation of the Occupational Therapist for safe DC planning  From OT standpoint, recommendation at time of d/c would be Home with family support  Patient to benefit from continued Occupational Therapy treatment while in the hospital to address deficits as defined above and maximize level of functional independence with ADLs and functional mobility  Pt left with call bell in reach, tray table in reach, needs met  RN aware  Plan   Treatment Interventions ADL retraining;Functional transfer training; Compensatory technique education   Goal Expiration Date 05/26/22   OT Treatment Day 1   OT Frequency 2-3x/wk   Recommendation   OT Discharge Recommendation No rehabilitation needs   Equipment Recommended Shower/Tub chair with back ($);Sock aid ($)   AM-PAC Daily Activity Inpatient   Lower Body Dressing 2   Bathing 3   Toileting 4   Upper Body Dressing 4   Grooming 4   Eating 4   Daily Activity Raw Score 21   Daily Activity Standardized Score (Calc for Raw Score >=11) 44 27   AM-PAC Applied Cognition Inpatient   Following a Speech/Presentation 4   Understanding Ordinary Conversation 4   Taking Medications 4   Remembering Where Things Are Placed or Put Away 4   Remembering List of 4-5 Errands 4   Taking Care of Complicated Tasks 4   Applied Cognition Raw Score 24   Applied Cognition Standardized Score 62 21           Soni Gallegos, OTR/L

## 2022-05-17 NOTE — ASSESSMENT & PLAN NOTE
Lab Results   Component Value Date    HGBA1C 6 3 (H) 05/02/2022     Patient has type 2 diabetes with lower extremity neuropathy  A1c was controlled      Continue gabapentin  Will monitor blood sugars closely

## 2022-05-17 NOTE — ASSESSMENT & PLAN NOTE
Patient has chronic hypertension  His blood sugar is stable postop  I agree holding lisinopril and HCTZ tonight

## 2022-05-17 NOTE — PROGRESS NOTES
[facility]  Progress Note - [address] 1960, 58 y o  male MRN: [id number]  Unit/Bed#: MS [facility] Encounter: [id number]  Primary Care Provider: Joellen Duron MD   Date and time admitted to hospital: 2022  6:34 AM    * Primary osteoarthritis of right knee  Assessment & Plan  Patient is status post right total knee replacement for osteoarthritis  Patient walked today  Knee pain is controlled  He is medically stable for discharge today      Type 2 diabetes mellitus with diabetic neuropathy, without long-term current use of insulin [facility])  Assessment & Plan    Lab Results   Component Value Date    HGBA1C 6 3 (H) 2022     Patient has type 2 diabetes with lower extremity neuropathy  A1c was controlled  Continue gabapentin  Will resume his home diabetes medications on discharge    Primary hypertension  Assessment & Plan  Patient has chronic hypertension  Lungs are clear to auscultation, heart is regular rhythm on discharge  Will continue lisinopril HCTZ discharge    Seizure disorder [facility])  Assessment & Plan  Patient has history of seizure disorder  He is well controlled  Continue Keppra  VTE Prophylaxis: in place    Patient Centered Rounds: I rounded with patient's nurse    Current Length of Stay: 0 day(s)    Current Patient Status: Outpatient Surgery    Certification Statement: Pt requires additional inpatient hospital stay due to: see assessment and plan        Subjective:   Patient has right knee pain is controlled  He walked today and even did steps  Denies chest pain, shortness abdominal pain    All other ROS are negative    Objective:     Vitals:   Temp (24hrs), Av 5 °F (36 4 °C), Min:97 2 °F (36 2 °C), Max:97 7 °F (36 5 °C)    Temp:  [97 2 °F (36 2 °C)-97 7 °F (36 5 °C)] 97 7 °F (36 5 °C)  HR:  [62-76] 62  Resp:  [12-24] 18  BP: (105-144)/(60-81) 112/72  SpO2:  [91 %-99 %] 99 %  Body mass index is 36 88 kg/m²       Input and Output Summary (last 24 hours): Intake/Output Summary (Last 24 hours) at 5/17/2022 0840  Last data filed at 5/16/2022 1058  Gross per 24 hour   Intake 1200 ml   Output --   Net 1200 ml       Physical Exam:     Physical Exam  Constitutional:       General: He is not in acute distress  Appearance: He is not toxic-appearing  HENT:      Head: Normocephalic  Mouth/Throat:      Pharynx: No oropharyngeal exudate  Eyes:      Conjunctiva/sclera: Conjunctivae normal    Cardiovascular:      Rate and Rhythm: Normal rate and regular rhythm  Heart sounds: No murmur heard  Pulmonary:      Effort: No respiratory distress  Breath sounds: No wheezing or rales  Abdominal:      General: Bowel sounds are normal    Musculoskeletal:      Cervical back: Neck supple  Comments: Right knee is dressed   Skin:     General: Skin is warm and dry  Neurological:      Mental Status: He is alert and oriented to person, place, and time  Cranial Nerves: No cranial nerve deficit  Motor: No weakness (Moves all extremities on command)  I personally reviewed labs and imaging reports for today        Last 24 Hours Medication List:   Current Facility-Administered Medications   Medication Dose Route Frequency Provider Last Rate    acetaminophen  975 mg Oral Q8H Nico Elise PA-C      allopurinol  200 mg Oral Daily Nico Elise PA-C      aluminum-magnesium hydroxide-simethicone  15 mL Oral Q6H PRN Nico Elise PA-C      ascorbic acid  500 mg Oral BID Nico Elise PA-C      bisacodyl  10 mg Rectal Daily PRN Nico Elise PA-C      calcium carbonate  1,000 mg Oral Daily PRN Nico Elise PA-C      cyclobenzaprine  5 mg Oral TID Nico Elise PA-C      diazepam  5 mg Oral Q8H PRN Nico Elise PA-C      docusate sodium  100 mg Oral BID Nico Elise PA-C      enoxaparin  30 mg Subcutaneous Q12H [facility] Lu Krishnamurthy      ferrous sulfate  325 mg Oral BID With Meals Nico Elise PA-C      folic acid  1 mg Oral Daily Melissa Resendiz Meridee Ganser, AUREA      gabapentin  600 mg Oral TID Devora Brood, PA-C      insulin lispro  1-6 Units Subcutaneous TID AC Stephen Smalls, PA-C      insulin lispro  1-6 Units Subcutaneous HS Stephen Higgins, PA-PITO      lactated ringers  1,000 mL Intravenous Once PRN Devora Brood, PA-C      And    lactated ringers  1,000 mL Intravenous Once PRN Devora Brood, PA-C      lactated ringers  75 mL/hr Intravenous Continuous Devora Brood, PA-PITO Stopped (05/17/22 0805)    levETIRAcetam  1,500 mg Oral BID Devora Brood, AUREA      loratadine  10 mg Oral Daily Devora Brood, [place]      metFORMIN  1,000 mg Oral Daily With General Simmons, PA-PITO      morphine injection  2 mg Intravenous Q3H PRN Devora Brood, PA-PITO      multivitamin-minerals  1 tablet Oral Daily Devora Brood, PA-C      ondansetron  4 mg Intravenous Q4H PRN Devora Brood, PA-C      oxyCODONE  10 mg Oral Q4H PRN Devora Brood, PA-C      oxyCODONE  5 mg Oral Q4H PRN Devora Brood, PA-C      pantoprazole  40 mg Oral Daily Before Breakfast Devora Brood, PA-C      pravastatin  80 mg Oral Daily With General Simmons, PA-C      senna  1 tablet Oral Daily Devora Brood, PA-C      simethicone  80 mg Oral 4x Daily PRN Devora Brood, PA-C      sodium chloride  1,000 mL Intravenous Once PRN Devora Brood, PA-C      And    sodium chloride  1,000 mL Intravenous Once PRN Devora Brood, PA-C      Tofacitinib Citrate ER  11 mg Oral Daily Devora Brood, PA-C      traMADol  50 mg Oral Q6H [facility] Devora Brood, PA-C            Today, Patient Was Seen By: Samuel Ramirez MD    ** Please Note: Dictation voice to text software may have been used in the creation of this document   **

## 2022-05-17 NOTE — PLAN OF CARE
Problem: OCCUPATIONAL THERAPY ADULT  Goal: Performs self-care activities at highest level of function for planned discharge setting  See evaluation for individualized goals  Description: Treatment Interventions: ADL retraining, Functional transfer training, Compensatory technique education  Equipment Recommended: Shower/Tub chair with back ($), Sock aid ($)       See flowsheet documentation for full assessment, interventions and recommendations  Outcome: Progressing  Note: Limitation: Decreased ADL status, Decreased self-care trans, Decreased high-level ADLs  Prognosis: Good  Assessment: Pt seen for OT tx session with focus on functional balance, functional mobility, ADL status, and transfer safety  Patient agreeable to OT treatment session  Pt received seated OOB to Recliner  Reviewed and practiced strategies for LB dressing including donning/doffing compression stockings  Discussed AD options to maximize independence  Pt aware how to privately purchase reviewed equipment at surgical supply store  Discussed appropriate DME for shower safety  Wife reports will assist as needed  Patient continues to be functioning below baseline level, occupational performance remains limited secondary to factors listed above, and pt at increased risk for falls and injury  The patient's raw score on the AM-PAC Daily Activity inpatient short form is 21, standardized score is 44 27, greater than 39 4  Patients at this level are likely to benefit from DC to home  Please refer to the recommendation of the Occupational Therapist for safe DC planning  From OT standpoint, recommendation at time of d/c would be Home with family support  Patient to benefit from continued Occupational Therapy treatment while in the hospital to address deficits as defined above and maximize level of functional independence with ADLs and functional mobility  Pt left with call bell in reach, tray table in reach, needs met  RN aware    OT Discharge Recommendation: No rehabilitation needs

## 2022-05-17 NOTE — PHYSICAL THERAPY NOTE
PHYSICAL THERAPY NOTE       05/17/22 0835   PT Last Visit   PT Visit Date 05/17/22   Note Type   Note Type Treatment   Pain Assessment   Pain Assessment Tool 0-10   Pain Score 3   Pain Location/Orientation Location: Knee;Orientation: Right   Restrictions/Precautions   Weight Bearing Precautions Per Order Yes   RLE Weight Bearing Per Order WBAT   Other Precautions Pain; Fall Risk   General   Chart Reviewed Yes   Response to Previous Treatment Patient with no complaints from previous session     Cognition   Overall Cognitive Status WFL   Arousal/Participation Alert   Attention Within functional limits   Orientation Level Oriented X4   Memory Within functional limits   Following Commands Follows all commands and directions without difficulty   Subjective   Subjective "I am hoping to go home today "   Bed Mobility   Supine to Sit 7  Independent   Additional items HOB elevated   Additional Comments Sat edge of bed for approx 1 min independently   Transfers   Sit to Stand 6  Modified independent   Additional items Armrests   Stand to Sit 6  Modified independent   Additional items Armrests   Additional Comments Stood for approx 1 minute mod I with RW   Ambulation/Elevation   Gait pattern Decreased R stance  (decreased R knee flexion and heel strike)   Gait Assistance 5  Supervision   Assistive Device Rolling walker   Distance 15ft, 200ft  (Progressing to a step through pattern)   Stair Management Assistance 5  Supervision   Additional items Verbal cues  (demonstration provided)   Stair Management Technique One rail L;Nonreciprocal  (2 hands on railing)   Number of Stairs 12   Balance   Static Sitting Normal   Dynamic Sitting Normal   Static Standing Good   Dynamic Standing Good   Ambulatory Good   Endurance Deficit   Endurance Deficit Yes   Endurance Deficit Description Limited by pain   Activity Tolerance   Activity Tolerance Patient limited by pain   Nurse Made Aware Megan KESSLER   Exercises   Quad Sets Supine;10 reps;Bilateral   Heelslides Sitting;5 reps;Right   Knee Extension Stretch Supine  (RLE elevated on towl roll)   Ankle Pumps Supine;Bilateral   Assessment   Prognosis Good   Problem List Decreased strength;Decreased range of motion;Decreased mobility; Impaired balance;Pain   Assessment Patient was received supine in bed and agreeable to session  Reports low pain levels and that he ambulated in the hallway yesterday  Patient tolerated session well  He improved this session and is not at a mod I/supervision level for all mobility  He increased his ambulation distance today and is progressing to a step through gait  Patient also able to complete a full flight of stairs with spouse present  Educated on home there-ex, use of ice and importance of ambulating at home  Recommending outpatient P T  The patient's AM-PAC Basic Mobility Inpatient Short Form Raw Score is 22  A Raw score of greater than 17 suggests the patient may benefit from discharge to home  Please also refer to the recommendation of the Physical Therapist for safe discharge planning  Barriers to Discharge None   Goals   Patient Goals To go home today   STG Expiration Date 05/23/22   Plan   Treatment/Interventions Functional transfer training;LE strengthening/ROM; Elevations; Therapeutic exercise; Endurance training;Equipment eval/education; Bed mobility;Gait training;Spoke to nursing   Progress Progressing toward goals   PT Frequency Twice a day   Recommendation   PT Discharge Recommendation Home with home health rehabilitation   Additional Comments 2 Owns a RW   AM-PAC Basic Mobility Inpatient   Turning in Bed Without Bedrails 4   Lying on Back to Sitting on Edge of Flat Bed 4   Moving Bed to Chair 4   Standing Up From Chair 4   Walk in Room 3   Climb 3-5 Stairs 3   Basic Mobility Inpatient Raw Score 22   Basic Mobility Standardized Score 47 4   Highest Level Of Mobility   -[facility] Goal 7: Walk 25 feet or more   -HLM Achieved 7: Walk 25 feet or more   Education   Education Provided Mobility training;Home exercise program;Assistive device   Patient Demonstrates acceptance/verbal understanding   End of Consult   Patient Position at End of Consult Bedside chair; All needs within reach  (LE's elevated)   Natalia Goodwin, PT            Patient Name: Mason Porras Date: 5/17/2022

## 2022-05-17 NOTE — PLAN OF CARE
Problem: PAIN - ADULT  Goal: Verbalizes/displays adequate comfort level or baseline comfort level  Description: Interventions:  - Encourage patient to monitor pain and request assistance  - Assess pain using appropriate pain scale  - Administer analgesics based on type and severity of pain and evaluate response  - Implement non-pharmacological measures as appropriate and evaluate response  - Consider cultural and social influences on pain and pain management  - Notify physician/advanced practitioner if interventions unsuccessful or patient reports new pain  Outcome: Progressing     Problem: INFECTION - ADULT  Goal: Absence or prevention of progression during hospitalization  Description: INTERVENTIONS:  - Assess and monitor for signs and symptoms of infection  - Monitor lab/diagnostic results  - Monitor all insertion sites, i e  indwelling lines, tubes, and drains  - Monitor endotracheal if appropriate and nasal secretions for changes in amount and color  - [place] appropriate cooling/warming therapies per order  - Administer medications as ordered  - Instruct and encourage patient and family to use good hand hygiene technique  - Identify and instruct in appropriate isolation precautions for identified infection/condition  Outcome: Progressing     Problem: SAFETY ADULT  Goal: Patient will remain free of falls  Description: INTERVENTIONS:  - Educate patient/family on patient safety including physical limitations  - Instruct patient to call for assistance with activity   - Consult OT/PT to assist with strengthening/mobility   - Keep Call bell within reach  - Keep bed low and locked with side rails adjusted as appropriate  - Keep care items and personal belongings within reach  - Initiate and maintain comfort rounds  - Make Fall Risk Sign visible to staff  - Offer Toileting every 2 Hours, in advance of need  - Initiate/Maintain bed alarm  - Obtain necessary fall risk management equipment:   - Apply yellow socks and bracelet for high fall risk patients  - Consider moving patient to room near nurses station  Outcome: Progressing  Goal: Maintain or return to baseline ADL function  Description: INTERVENTIONS:  -  Assess patient's ability to carry out ADLs; assess patient's baseline for ADL function and identify physical deficits which impact ability to perform ADLs (bathing, care of mouth/teeth, toileting, grooming, dressing, etc )  - Assess/evaluate cause of self-care deficits   - Assess range of motion  - Assess patient's mobility; develop plan if impaired  - Assess patient's need for assistive devices and provide as appropriate  - Encourage maximum independence but intervene and supervise when necessary  - Involve family in performance of ADLs  - Assess for home care needs following discharge   - Consider OT consult to assist with ADL evaluation and planning for discharge  - Provide patient education as appropriate  Outcome: Progressing  Goal: Maintains/Returns to pre admission functional level  Description: INTERVENTIONS:  - Perform BMAT or MOVE assessment daily    - Set and communicate daily mobility goal to care team and patient/family/caregiver  - Collaborate with rehabilitation services on mobility goals if consulted  - Perform Range of Motion 2 times a day  - Reposition patient every 2 hours    - Dangle patient 2 times a day  - Stand patient 2 times a day  - Ambulate patient 2 times a day  - Out of bed to chair 2 times a day   - Out of bed for meals 2 times a day  - Out of bed for toileting  - Record patient progress and toleration of activity level   Outcome: Progressing     Problem: DISCHARGE PLANNING  Goal: Discharge to home or other facility with appropriate resources  Description: INTERVENTIONS:  - Identify barriers to discharge w/patient and caregiver  - Arrange for needed discharge resources and transportation as appropriate  - Identify discharge learning needs (meds, wound care, etc )  - Arrange for interpretive services to assist at discharge as needed  - Refer to Case Management Department for coordinating discharge planning if the patient needs post-hospital services based on physician/advanced practitioner order or complex needs related to functional status, cognitive ability, or social support system  Outcome: Progressing     Problem: Knowledge Deficit  Goal: Patient/family/caregiver demonstrates understanding of disease process, treatment plan, medications, and discharge instructions  Description: Complete learning assessment and assess knowledge base    Interventions:  - Provide teaching at level of understanding  - Provide teaching via preferred learning methods  Outcome: Progressing

## 2022-05-17 NOTE — ASSESSMENT & PLAN NOTE
Patient is status post right total knee replacement for osteoarthritis  He is medically stable postop  Agree with IV hydration overnight  Agree with subcutaneous Lovenox  Patient already ambulated with improvement in pain compared to before surgery  Will likely discontinue IV fluids tomorrow  Continue p r n   Oxycodone for pain control    I discussed the case with patient's wife at the bedside

## 2022-05-17 NOTE — PROGRESS NOTES
Amy Meek  58 y o   male  MR#: [id number]  5/17/2022    Post-op days: 1  Extremity: right knee    Subjective:  Patient seen and examined  Lying comfortably in bed  Pain is well controlled  No major issues overnight  Denies any chest pain, shortness of breath, fevers or chills  Got up with physical therapy yesterday  Vitals:   Vitals:    05/16/22 2051   BP: 113/67   Pulse: 68   Resp: 18   Temp: 97 7 °F (36 5 °C)   SpO2:        Exam:   A&O x 3 NAD  Right knee:  Mepilex dressing intact  Small amount of sanguinous drainage showing on mid aspect of incision  Thigh and calf compartments soft and compressible  NV intact      Labs:   WBC No results for input(s): WBC in the last 72 hours  H/H   Recent Labs     05/17/22  0508   HGB 11 1*   /  Recent Labs     05/17/22  0508   HCT 34 6*     Sed Rate No results for input(s): SEDRATE in the last 72 hours  CRP No results for input(s): CRP in the last 72 hours  Assessment:   S/p right TKA    Plan:   WBAT to RLE with assistance  PT/OT  Pain control  DVT prophylaxis:  Lovenox, mechanical   Will discharge home on  mg b i d  X6 weeks  ABLA: Hbg 11 1  Stable at this time  Will continue to monitor vitals and H/H  YESENIA stocking- knee high to be applied to RLE today  DC planning: Discharge home once cleared by PT/OT and medicine  Possibly today  Follow up in office in 10-14 days

## 2022-05-17 NOTE — ASSESSMENT & PLAN NOTE
Lab Results   Component Value Date    HGBA1C 6 3 (H) 05/02/2022     Patient has type 2 diabetes with lower extremity neuropathy  A1c was controlled      Continue gabapentin  Will resume his home diabetes medications on discharge

## 2022-05-17 NOTE — CONSULTS
[address] [place] [address] 1960, 58 y o  male MRN: [id number]  Unit/Bed#: -01 Encounter: [id number]  Primary Care Provider: Aranza Griggs MD   Date and time admitted to hospital: 5/16/2022  6:34 AM    Inpatient consult to Internal Medicine  Consult performed by: Haritha Jauregui MD  Consult ordered by: Valentin Muñoz PA-C          * Primary osteoarthritis of right knee  Assessment & Plan  Patient is status post right total knee replacement for osteoarthritis  He is medically stable postop  Agree with IV hydration overnight  Agree with subcutaneous Lovenox  Patient already ambulated with improvement in pain compared to before surgery  Will likely discontinue IV fluids tomorrow  Continue p r n  Oxycodone for pain control    I discussed the case with patient's wife at the bedside      Type 2 diabetes mellitus with diabetic neuropathy, without long-term current use of insulin [facility])  Assessment & Plan    Lab Results   Component Value Date    HGBA1C 6 3 (H) 05/02/2022     Patient has type 2 diabetes with lower extremity neuropathy  A1c was controlled  Continue gabapentin  Will monitor blood sugars closely    Primary hypertension  Assessment & Plan  Patient has chronic hypertension  His blood sugar is stable postop  I agree holding lisinopril and HCTZ tonight  Seizure disorder [facility])  Assessment & Plan  Patient has history of seizure disorder  He is well controlled  Continue Keppra  Rheumatoid arthritis [facility])  Assessment & Plan  Patient has mild arthritis  He denies acute synovitis  Jose Cruz Brock is on hold        History of Present Illness:    Екатерина Lawson is a 58 y o  male who is originally admitted to the orthopedic service on 5/16/2022 due to osteoarthritis of the right knee, status post total right knee replacement  We are consulted for hypertension, diabetes management  Review of Systems:    Review of Systems   Constitutional: Negative for fever  HENT: Negative for congestion  Eyes: Negative for visual disturbance  Respiratory: Negative for cough and shortness of breath  Cardiovascular: Negative for chest pain, palpitations and leg swelling  Gastrointestinal: Negative for abdominal pain, blood in stool and diarrhea  Endocrine: Negative for polydipsia and polyphagia  Genitourinary: Negative for difficulty urinating and hematuria  Musculoskeletal:        Right knee pain is controlled tonight   Skin: Negative for rash  Neurological: Positive for numbness (Of both feet)  Negative for weakness and headaches  Hematological: Negative for adenopathy  Psychiatric/Behavioral: Negative for dysphoric mood  All other systems reviewed and are negative        Past Medical and Surgical History:     Past Medical History:   Diagnosis Date    Arthritis     CPAP (continuous positive airway pressure) dependence     Diabetes mellitus (HCC)     Gout     Hyperlipidemia     Hypertension     Peripheral neuropathy     PONV (postoperative nausea and vomiting)     Rheumatoid arthritis (HCC)     Seizures (HCC)     Sleep apnea        Past Surgical History:   Procedure Laterality Date    COLONOSCOPY      HAND SURGERY Left 02/2019    KNEE ARTHROSCOPY Right     x 2    NH KNEE SCOPE,MED/LAT MENISECTOMY Right 8/9/2019    Procedure: ARTHROSCOPY KNEE, RIGHT PARTIAL MEDIAL MENISCECTOMY,RESECTION ANTEROMEDIAL PLICA;  Surgeon: Rodney Rodriguez MD;  Location: [facility];  Service: Orthopedics       Meds/Allergies:      Current Facility-Administered Medications:     acetaminophen (TYLENOL) tablet 975 mg, 975 mg, Oral, Q8H, 975 mg at 05/16/22 1238    allopurinol (ZYLOPRIM) tablet 200 mg, 200 mg, Oral, Daily, 200 mg at 05/16/22 1610    aluminum-magnesium hydroxide-simethicone (MYLANTA) oral suspension 15 mL, 15 mL, Oral, Q6H PRN    ascorbic acid (VITAMIN C) tablet 500 mg, 500 mg, Oral, BID, 500 mg at 05/16/22 1744    bisacodyl (DULCOLAX) rectal suppository 10 mg, 10 mg, Rectal, Daily PRN    calcium carbonate (TUMS) chewable tablet 1,000 mg, 1,000 mg, Oral, Daily PRN    cyclobenzaprine (FLEXERIL) tablet 5 mg, 5 mg, Oral, TID, 5 mg at 05/16/22 1610    diazepam (VALIUM) tablet 5 mg, 5 mg, Oral, Q8H PRN    docusate sodium (COLACE) capsule 100 mg, 100 mg, Oral, BID, 100 mg at 05/16/22 1745    enoxaparin (LOVENOX) subcutaneous injection 30 mg, 30 mg, Subcutaneous, Q12H [facility]    ferrous sulfate tablet 325 mg, 325 mg, Oral, BID With Meals, 325 mg at 46/22/28 7625    folic acid (FOLVITE) tablet 1 mg, 1 mg, Oral, Daily, 1 mg at 05/16/22 1238    gabapentin (NEURONTIN) capsule 600 mg, 600 mg, Oral, TID, 600 mg at 05/16/22 1610    lactated ringers bolus 1,000 mL, 1,000 mL, Intravenous, Once PRN **AND** lactated ringers bolus 1,000 mL, 1,000 mL, Intravenous, Once PRN    lactated ringers infusion, 75 mL/hr, Intravenous, Continuous, 75 mL/hr at 05/16/22 1233    levETIRAcetam (KEPPRA) tablet 1,500 mg, 1,500 mg, Oral, BID, 1,500 mg at 05/16/22 1744    loratadine (CLARITIN) tablet 10 mg, 10 mg, Oral, Daily, 10 mg at 05/16/22 1610    metFORMIN (GLUCOPHAGE-XR) 24 hr tablet 1,000 mg, 1,000 mg, Oral, Daily With Dinner, 1,000 mg at 05/16/22 1610    morphine injection 2 mg, 2 mg, Intravenous, Q3H PRN    multivitamin-minerals (CENTRUM) tablet 1 tablet, 1 tablet, Oral, Daily, 1 tablet at 05/16/22 1610    ondansetron (ZOFRAN) injection 4 mg, 4 mg, Intravenous, Q4H PRN    oxyCODONE (ROXICODONE) IR tablet 10 mg, 10 mg, Oral, Q4H PRN    oxyCODONE (ROXICODONE) IR tablet 5 mg, 5 mg, Oral, Q4H PRN, 5 mg at 05/16/22 1229    pantoprazole (PROTONIX) EC tablet 40 mg, 40 mg, Oral, Daily Before Breakfast, 40 mg at 05/16/22 1238    pravastatin (PRAVACHOL) tablet 80 mg, 80 mg, Oral, Daily With Dinner, 80 mg at 05/16/22 1610    senna (SENOKOT) tablet 8 6 mg, 1 tablet, Oral, Daily, 8 6 mg at 05/16/22 1238    simethicone (MYLICON) chewable tablet 80 mg, 80 mg, Oral, 4x Daily PRN    sodium chloride 0 9 % bolus 1,000 mL, 1,000 mL, Intravenous, Once PRN **AND** sodium chloride 0 9 % bolus 1,000 mL, 1,000 mL, Intravenous, Once PRN    Tofacitinib Citrate ER TB24 11 mg, 11 mg, Oral, Daily    traMADol (ULTRAM) tablet 50 mg, 50 mg, Oral, Q6H IRA, 50 mg at 22 1744    Prior to Admission Medications   Prescriptions Last Dose Informant Patient Reported? Taking?    BYDUREON 2 MG PEN Past Week at Unknown time Spouse/Significant Other Yes Yes   Sig: INJECT INSULIN AS DIRECTED ONCE A WEEK SUBCUTANEOUS 30 DAYS   [facility] Vitamin C 500 MG tablet 5/15/2022 at Unknown time  No Yes   Sig: TAKE 1 TABLET BY MOUTH TWICE A DAY   LIVALO 2 MG 5/15/2022 at Unknown time Spouse/Significant Other Yes Yes   Sig: Take 4 mg by mouth daily     Multiple Vitamin (MULTIVITAMIN) capsule 5/15/2022 at Unknown time Spouse/Significant Other Yes Yes   Sig: Take 1 capsule by mouth daily   Xeljanz XR 11 MG TB24 Past Month at Unknown time  Yes Yes   Si mg in the morning   allopurinol (ZYLOPRIM) 100 mg tablet 5/15/2022 at Unknown time Spouse/Significant Other Yes Yes   Sig: Take 200 mg by mouth daily   diazepam (VALIUM) 5 mg tablet Past Month at Unknown time  Yes Yes   Sig: Take 5 mg by mouth   ferrous sulfate 324 (65 Fe) mg 5/15/2022 at Unknown time  No Yes   Sig: TAKE 1 TABLET BY MOUTH 2 TIMES A DAY BEFORE MEALS   fexofenadine (ALLEGRA) 180 MG tablet 5/15/2022 at Unknown time Spouse/Significant Other Yes Yes   Sig: Take 180 mg by mouth daily   folic acid (FOLVITE) 1 mg tablet 5/15/2022 at Unknown time Spouse/Significant Other Yes Yes   Sig: Take 1,000 mcg by mouth daily     gabapentin (NEURONTIN) 600 MG tablet 2022 at 0500  Yes Yes   Sig: Take 600 mg by mouth 3 (three) times a day   levETIRAcetam (KEPPRA) 1000 MG tablet 2022 at 0500  Yes Yes   Si,500 mg 2 (two) times a day     lisinopril-hydrochlorothiazide (PRINZIDE,ZESTORETIC) 10-12 5 MG per tablet 5/15/2022 at Unknown time Spouse/Significant Other Yes Yes   Sig: Take 1 tablet by mouth daily   metFORMIN (GLUCOPHAGE-XR) 500 mg 24 hr tablet 5/15/2022 at Unknown time Spouse/Significant Other Yes Yes   Sig: TAKE 2 TABLETS BY MOUTH DAILY WITH EVENING MEAL      Facility-Administered Medications: None       Allergies: Allergies   Allergen Reactions    Infliximab Anaphylaxis       Social History:     Substance Use History:   Social History     Substance and Sexual Activity   Alcohol Use Yes    Alcohol/week: 1 0 standard drink    Types: 1 Standard drinks or equivalent per week     Social History     Tobacco Use   Smoking Status Former Smoker    Packs/day: 2 00    Years: 25 00    Pack years: 50 00    Quit date:     Years since quittin 3   Smokeless Tobacco Never Used     Social History     Substance and Sexual Activity   Drug Use Never       Family History:    Family History   Problem Relation Age of Onset    Diabetes Father     Heart disease Father     Hypertension Father        Physical Exam:     Vitals:   Blood Pressure: 116/60 (22 1346)  Pulse: 68 (22 1346)  Temperature: (!) 97 4 °F (36 3 °C) (22 1346)  Temp Source: Oral (22 1346)  Respirations: 20 (22 1346)  Height: 5' 11" (180 3 cm) (22 0504)  Weight - Scale: 120 kg (264 lb 6 4 oz) (22 0648)  SpO2: 91 % (22 1346)    Physical Exam  HENT:      Head: Normocephalic  Eyes:      Conjunctiva/sclera: Conjunctivae normal    Cardiovascular:      Rate and Rhythm: Normal rate and regular rhythm  Heart sounds: No murmur heard  Pulmonary:      Effort: No respiratory distress  Breath sounds: No wheezing or rales  Abdominal:      General: Bowel sounds are normal       Palpations: Abdomen is soft  Tenderness: There is no abdominal tenderness  Musculoskeletal:      Cervical back: Neck supple  Comments: Right knee is dressed   Skin:     General: Skin is warm and dry  Neurological:      Mental Status: He is alert        Cranial Nerves: No cranial nerve deficit (Speech is normal)  Comments: Patient is able to move the right foot on command   Psychiatric:         Mood and Affect: Mood normal              Additional Data:     Lab and imaging results: I personally reviewed them    Results from last 7 days   Lab Units 05/16/22  1706   PLATELETS Thousands/uL 148*           Invalid input(s): LABALBU          Lab Results   Component Value Date/Time    HGBA1C 6 3 (H) 05/02/2022 06:37 AM    HGBA1C 7 9 (H) 02/01/2022 06:45 AM    HGBA1C 7 4 07/18/2019 12:00 AM       No orders to display       Teresa Manrique MD    ** Please Note: This note has been constructed using a voice recognition system   **

## 2022-05-19 ENCOUNTER — TELEPHONE (OUTPATIENT)
Dept: OBGYN CLINIC | Facility: HOSPITAL | Age: 62
End: 2022-05-19

## 2022-05-19 NOTE — TELEPHONE ENCOUNTER
Pt contacted to complete a postoperative follow up call assessment  He reports doing "alright, going well"since being home  He is "moving" with the RW, and has outpatient PT this morning  He reports the swelling is "the same" and he has the compression sock on  He is icing, and reports his dressing is dry with no drainage, "no change "     We reviewed his after visit summary medication list   He reports taking Tylenol, 975 mg 3 times a day, and oxycodone 5 mg for pain  He reports he is pacing out the oxycodone administration, taking it about every 8 hours  He is taking aspirin 325 mg twice a day, and over-the-counter stool softeners  He denies having a bowel movement and we discussed including MiraLax if he needs it  He denies any chest pain, shortness of breath, dizziness, fever, or calf pain  He denies any questions or concerns at this time  He was advised to contact our team with any questions or issues arise  Dictation was used for this assessment, any areas may be related to interpretation

## 2022-05-31 ENCOUNTER — APPOINTMENT (OUTPATIENT)
Dept: RADIOLOGY | Facility: CLINIC | Age: 62
End: 2022-05-31
Payer: COMMERCIAL

## 2022-05-31 ENCOUNTER — OFFICE VISIT (OUTPATIENT)
Dept: OBGYN CLINIC | Facility: CLINIC | Age: 62
End: 2022-05-31

## 2022-05-31 VITALS
SYSTOLIC BLOOD PRESSURE: 118 MMHG | DIASTOLIC BLOOD PRESSURE: 72 MMHG | HEIGHT: 71 IN | WEIGHT: 264 LBS | BODY MASS INDEX: 36.96 KG/M2

## 2022-05-31 DIAGNOSIS — Z47.1 AFTERCARE FOLLOWING RIGHT KNEE JOINT REPLACEMENT SURGERY: Primary | ICD-10-CM

## 2022-05-31 DIAGNOSIS — Z96.651 AFTERCARE FOLLOWING RIGHT KNEE JOINT REPLACEMENT SURGERY: Primary | ICD-10-CM

## 2022-05-31 DIAGNOSIS — Z47.1 AFTERCARE FOLLOWING RIGHT KNEE JOINT REPLACEMENT SURGERY: ICD-10-CM

## 2022-05-31 DIAGNOSIS — Z96.651 AFTERCARE FOLLOWING RIGHT KNEE JOINT REPLACEMENT SURGERY: ICD-10-CM

## 2022-05-31 PROCEDURE — 73562 X-RAY EXAM OF KNEE 3: CPT

## 2022-05-31 PROCEDURE — 99024 POSTOP FOLLOW-UP VISIT: CPT | Performed by: ORTHOPAEDIC SURGERY

## 2022-05-31 NOTE — ASSESSMENT & PLAN NOTE
Patient is doing well s/p right total knee arthroplasty  X-rays were reviewed with the patient that reveal a well-aligned prosthesis with no evidence of loosening  Continue formal physical therapy and home exercises  Stretches for extension and flexion were demonstrated for the patient that he has to do multiple times a day  Continue icing and elevating  He may get the incision wet in the shower  Continue aspirin for DVT prophylaxis for the next 4 weeks  Follow-up in 3 months with repeat x-rays  All questions were answered to patient's satisfaction

## 2022-05-31 NOTE — PROGRESS NOTES
Assessment:     1  Aftercare following right knee joint replacement surgery        Plan:     Problem List Items Addressed This Visit        Other    Aftercare following right knee joint replacement surgery - Primary     Patient is doing well s/p right total knee arthroplasty  X-rays were reviewed with the patient that reveal a well-aligned prosthesis with no evidence of loosening  Continue formal physical therapy and home exercises  Stretches for extension and flexion were demonstrated for the patient that he has to do multiple times a day  Continue icing and elevating  He may get the incision wet in the shower  Continue aspirin for DVT prophylaxis for the next 4 weeks  Follow-up in 3 months with repeat x-rays  All questions were answered to patient's satisfaction  Relevant Orders    XR knee 3 vw right non injury         Subjective:     Patient ID: Napoleon Swartz is a 58 y o  male  Chief Complaint: This is a 28-year-old white male who is status post right total knee arthroplasty on May 16, 2022  He is currently attending outpatient physical therapy twice a week  He is ambulating with a cane  Pain is well controlled with Tylenol  He is taking aspirin for DVT prophylaxis as directed  He denies any fevers or chills        Allergy:  Allergies   Allergen Reactions    Infliximab Anaphylaxis     Medications:  all current active meds have been reviewed  Past Medical History:  Past Medical History:   Diagnosis Date    Arthritis     CPAP (continuous positive airway pressure) dependence     Diabetes mellitus (HCC)     Gout     Hyperlipidemia     Hypertension     Peripheral neuropathy     PONV (postoperative nausea and vomiting)     Rheumatoid arthritis (HCC)     Seizures (HCC)     Sleep apnea      Past Surgical History:  Past Surgical History:   Procedure Laterality Date    COLONOSCOPY      HAND SURGERY Left 02/2019    KNEE ARTHROSCOPY Right     x 2    [place] KNEE SCOPE,MED/LAT MENISECTOMY Right 2019    Procedure: ARTHROSCOPY KNEE, RIGHT PARTIAL MEDIAL MENISCECTOMY,RESECTION ANTEROMEDIAL PLICA;  Surgeon: Flaco Marinelli MD;  Location:  MAIN OR;  Service: Orthopedics    [place] TOTAL KNEE ARTHROPLASTY Right 2022    Procedure: ARTHROPLASTY KNEE TOTAL;  Surgeon: Flaco Marinelli MD;  Location:  MAIN OR;  Service: Orthopedics     Family History:  Family History   Problem Relation Age of Onset    Diabetes Father     Heart disease Father     Hypertension Father      Social History:  Social History     Substance and Sexual Activity   Alcohol Use Yes    Alcohol/week: 1 0 standard drink    Types: 1 Standard drinks or equivalent per week     Social History     Substance and Sexual Activity   Drug Use Never     Social History     Tobacco Use   Smoking Status Former Smoker    Packs/day: 2 00    Years: 25 00    Pack years: 50 00    Quit date:     Years since quittin 4   Smokeless Tobacco Never Used     Review of Systems   Constitutional: Negative  HENT: Negative  Eyes: Negative  Respiratory: Negative  Cardiovascular: Negative  Gastrointestinal: Negative  Endocrine: Negative  Genitourinary: Negative  Musculoskeletal: Positive for arthralgias, gait problem (using a cane) and joint swelling  Skin: Negative  Allergic/Immunologic: Negative  Hematological: Negative  Psychiatric/Behavioral: Negative  Objective:  BP Readings from Last 1 Encounters:   22 118/72      Wt Readings from Last 1 Encounters:   22 120 kg (264 lb)      BMI:   Estimated body mass index is 36 82 kg/m² as calculated from the following:    Height as of this encounter: 5' 11" (1 803 m)  Weight as of this encounter: 120 kg (264 lb)  BSA:   Estimated body surface area is 2 38 meters squared as calculated from the following:    Height as of this encounter: 5' 11" (1 803 m)  Weight as of this encounter: 120 kg (264 lb)     Physical Exam  Constitutional:       General: He is not in acute distress  Appearance: He is well-developed  HENT:      Head: Normocephalic  Eyes:      Conjunctiva/sclera: Conjunctivae normal       Pupils: Pupils are equal, round, and reactive to light  Pulmonary:      Effort: Pulmonary effort is normal  No respiratory distress  Musculoskeletal:      Right knee: Effusion present  Skin:     General: Skin is warm and dry  Neurological:      Mental Status: He is alert and oriented to person, place, and time  Psychiatric:         Behavior: Behavior normal        Right Knee Exam     Range of Motion   Extension: -5   Flexion: 100     Tests   Varus: negative Valgus: negative    Other   Erythema: absent  Scars: present (Well healing incision with no evidence of infection  No active drainage )  Sensation: normal  Pulse: present  Swelling: moderate  Effusion: effusion present    Comments:  Calf soft, nontender            I have personally reviewed pertinent films in PACS and my interpretation is X-rays of the right knee reveal a well-aligned prosthesis with no evidence of loosening

## 2022-06-06 ENCOUNTER — TELEPHONE (OUTPATIENT)
Dept: OBGYN CLINIC | Facility: MEDICAL CENTER | Age: 62
End: 2022-06-06

## 2022-06-06 ENCOUNTER — TELEPHONE (OUTPATIENT)
Dept: OBGYN CLINIC | Facility: OTHER | Age: 62
End: 2022-06-06

## 2022-06-06 NOTE — TELEPHONE ENCOUNTER
Returned call to number provided, PT not home, spouse took provider's message  She voiced understanding

## 2022-06-06 NOTE — TELEPHONE ENCOUNTER
Patient sees Dr Judy Kennedy  Patient calling on refills for Aspirin Ecotrin 325 mg, Ferrous Sulfate 324 mg, and Vitamin C 500 mg  Uses [facility] in [place], [place] on file  Ali Roles # [phone]

## 2022-06-06 NOTE — TELEPHONE ENCOUNTER
At this point he can take ASA 81 mg twice a day which is over the counter  Refills of the vitamins aren't needed that this point

## 2022-07-19 ENCOUNTER — TELEPHONE (OUTPATIENT)
Dept: OBGYN CLINIC | Facility: HOSPITAL | Age: 62
End: 2022-07-19

## 2022-07-19 DIAGNOSIS — Z47.1 AFTERCARE FOLLOWING RIGHT KNEE JOINT REPLACEMENT SURGERY: Primary | ICD-10-CM

## 2022-07-19 DIAGNOSIS — Z96.651 AFTERCARE FOLLOWING RIGHT KNEE JOINT REPLACEMENT SURGERY: Primary | ICD-10-CM

## 2022-07-19 RX ORDER — AMOXICILLIN 500 MG/1
2000 TABLET, FILM COATED ORAL ONCE
Qty: 4 TABLET | Refills: 3 | Status: SHIPPED | OUTPATIENT
Start: 2022-07-19 | End: 2022-07-19

## 2022-07-19 NOTE — TELEPHONE ENCOUNTER
Patient sees Dr Gladys Villafuerte      Patients wife is calling to see if patient needs to pre medicate prior to dental procedures  If so, please sent to [facility] on file        Patients dental appt is scheduled for 8/4      CB: [phone]

## 2022-08-04 ENCOUNTER — APPOINTMENT (OUTPATIENT)
Dept: RADIOLOGY | Facility: CLINIC | Age: 62
End: 2022-08-04
Payer: COMMERCIAL

## 2022-08-04 ENCOUNTER — OFFICE VISIT (OUTPATIENT)
Dept: OBGYN CLINIC | Facility: CLINIC | Age: 62
End: 2022-08-04

## 2022-08-04 VITALS
SYSTOLIC BLOOD PRESSURE: 122 MMHG | HEIGHT: 71 IN | WEIGHT: 256 LBS | BODY MASS INDEX: 35.84 KG/M2 | DIASTOLIC BLOOD PRESSURE: 70 MMHG

## 2022-08-04 DIAGNOSIS — Z47.1 AFTERCARE FOLLOWING RIGHT KNEE JOINT REPLACEMENT SURGERY: Primary | ICD-10-CM

## 2022-08-04 DIAGNOSIS — Z96.651 AFTERCARE FOLLOWING RIGHT KNEE JOINT REPLACEMENT SURGERY: Primary | ICD-10-CM

## 2022-08-04 DIAGNOSIS — Z47.1 AFTERCARE FOLLOWING RIGHT KNEE JOINT REPLACEMENT SURGERY: ICD-10-CM

## 2022-08-04 DIAGNOSIS — Z96.651 AFTERCARE FOLLOWING RIGHT KNEE JOINT REPLACEMENT SURGERY: ICD-10-CM

## 2022-08-04 PROCEDURE — 99024 POSTOP FOLLOW-UP VISIT: CPT | Performed by: ORTHOPAEDIC SURGERY

## 2022-08-04 PROCEDURE — 73562 X-RAY EXAM OF KNEE 3: CPT

## 2022-08-04 NOTE — ASSESSMENT & PLAN NOTE
Patient is doing well s/p right total knee arthroplasty  X-rays were reviewed with the patient that reveal a well-aligned prosthesis with no evidence of loosening  Discussed that clicking sensation in the knee may be from scar tissue vs the prosthesis  Knee is stable on examination today and symptoms cannot be reproduced  Discussed can take up to year to fully recover from the surgery and for the body to get use to the prosthesis  He is to continue the home exercise program to increase endurance  He was given a note to return to work with no restrictions on August 15, 2022  Follow-up in 3 months with repeat x-rays  All patient's questions were answered to his satisfaction  This note is created using dictation transcription  It may contain typographical errors, grammatical errors, improperly dictated words, background noise and other errors

## 2022-08-04 NOTE — PROGRESS NOTES
Assessment:     1  Aftercare following right knee joint replacement surgery        Plan:     Problem List Items Addressed This Visit        Other    Aftercare following right knee joint replacement surgery - Primary     Patient is doing well s/p right total knee arthroplasty  X-rays were reviewed with the patient that reveal a well-aligned prosthesis with no evidence of loosening  Discussed that clicking sensation in the knee may be from scar tissue vs the prosthesis  Knee is stable on examination today and symptoms cannot be reproduced  Discussed can take up to year to fully recover from the surgery and for the body to get use to the prosthesis  He is to continue the home exercise program to increase endurance  He was given a note to return to work with no restrictions on August 15, 2022  Follow-up in 3 months with repeat x-rays  All patient's questions were answered to his satisfaction  This note is created using dictation transcription  It may contain typographical errors, grammatical errors, improperly dictated words, background noise and other errors  Relevant Orders    XR knee 3 vw right non injury         Subjective:     Patient ID: Duong Marquez is a 58 y o  male  Chief Complaint: This is a 40-year-old white male who is status post right total knee arthroplasty on May 16, 2022  He has recently completed formal physical therapy  He is ambulating with no assistance  His 1 complaint is a clicking sensation over the anterior lateral aspect of the knee when walking or descending stairs  It is associated with pain at times  When going down stairs see feels his knee may give out on him  He denies any injury  The pain is not inhibiting him from activities    He is doing the home exercise program     Allergy:  Allergies   Allergen Reactions    Infliximab Anaphylaxis     Medications:  all current active meds have been reviewed  Past Medical History:  Past Medical History:   Diagnosis Date    Arthritis     CPAP (continuous positive airway pressure) dependence     Diabetes mellitus (HCC)     Gout     Hyperlipidemia     Hypertension     Peripheral neuropathy     PONV (postoperative nausea and vomiting)     Rheumatoid arthritis (HCC)     Seizures (HCC)     Sleep apnea      Past Surgical History:  Past Surgical History:   Procedure Laterality Date    COLONOSCOPY      HAND SURGERY Left 2019    KNEE ARTHROSCOPY Right     x 2    [place] KNEE SCOPE,MED/LAT MENISECTOMY Right 2019    Procedure: ARTHROSCOPY KNEE, RIGHT PARTIAL MEDIAL MENISCECTOMY,RESECTION ANTEROMEDIAL PLICA;  Surgeon: Girish Montgomery MD;  Location:  MAIN OR;  Service: Orthopedics    [place] TOTAL KNEE ARTHROPLASTY Right 2022    Procedure: ARTHROPLASTY KNEE TOTAL;  Surgeon: Girish Montgomery MD;  Location:  MAIN OR;  Service: Orthopedics     Family History:  Family History   Problem Relation Age of Onset    Diabetes Father     Heart disease Father     Hypertension Father      Social History:  Social History     Substance and Sexual Activity   Alcohol Use Yes    Alcohol/week: 1 0 standard drink    Types: 1 Standard drinks or equivalent per week     Social History     Substance and Sexual Activity   Drug Use Never     Social History     Tobacco Use   Smoking Status Former Smoker    Packs/day: 2 00    Years: 25 00    Pack years: 50 00    Quit date:     Years since quittin 6   Smokeless Tobacco Never Used     Review of Systems   Constitutional: Negative  HENT: Negative  Eyes: Negative  Respiratory: Negative  Cardiovascular: Negative  Gastrointestinal: Negative  Endocrine: Negative  Genitourinary: Negative  Musculoskeletal: Positive for arthralgias (Right knee) and joint swelling (Right knee)  Negative for gait problem  Skin: Negative  Allergic/Immunologic: Negative  Hematological: Negative  Psychiatric/Behavioral: Negative            Objective:  BP Readings from Last 1 Encounters: 08/04/22 122/70      Wt Readings from Last 1 Encounters:   08/04/22 116 kg (256 lb)      BMI:   Estimated body mass index is 35 7 kg/m² as calculated from the following:    Height as of this encounter: 5' 11" (1 803 m)  Weight as of this encounter: 116 kg (256 lb)  BSA:   Estimated body surface area is 2 34 meters squared as calculated from the following:    Height as of this encounter: 5' 11" (1 803 m)  Weight as of this encounter: 116 kg (256 lb)  Physical Exam  Vitals and nursing note reviewed  Constitutional:       General: He is not in acute distress  Appearance: Normal appearance  He is well-developed  HENT:      Head: Normocephalic and atraumatic  Right Ear: External ear normal       Left Ear: External ear normal    Eyes:      Extraocular Movements: Extraocular movements intact  Conjunctiva/sclera: Conjunctivae normal    Pulmonary:      Effort: Pulmonary effort is normal  No respiratory distress  Musculoskeletal:      Cervical back: Neck supple  Skin:     General: Skin is warm and dry  Neurological:      Mental Status: He is alert and oriented to person, place, and time  Psychiatric:         Mood and Affect: Mood normal          Behavior: Behavior normal        Right Knee Exam     Tenderness   The patient is experiencing no tenderness  Range of Motion   Extension: normal   Flexion: 110     Tests   Varus: negative Valgus: negative  Patellar apprehension: negative    Other   Erythema: absent  Scars: present (Well healed incision with no evidence of infection   )  Sensation: normal  Pulse: present  Swelling: moderate    Comments:  Calf soft, nontender  No clicking sensation palpated with active and passive range of motion            I have personally reviewed pertinent films in PACS and my interpretation is X-rays of the right knee reveal a well-aligned prosthesis with no evidence of loosening       Scribe Attestation    I,:  Nusrat Miramontes PA-C am acting as a scribe while in the presence of the attending physician :       I,:  Patricia Aguero MD personally performed the services described in this documentation    as scribed in my presence :

## 2022-11-15 ENCOUNTER — OFFICE VISIT (OUTPATIENT)
Dept: OBGYN CLINIC | Facility: CLINIC | Age: 62
End: 2022-11-15

## 2022-11-15 ENCOUNTER — APPOINTMENT (OUTPATIENT)
Dept: RADIOLOGY | Facility: CLINIC | Age: 62
End: 2022-11-15

## 2022-11-15 VITALS
SYSTOLIC BLOOD PRESSURE: 122 MMHG | HEIGHT: 71 IN | WEIGHT: 262 LBS | DIASTOLIC BLOOD PRESSURE: 80 MMHG | BODY MASS INDEX: 36.68 KG/M2

## 2022-11-15 DIAGNOSIS — Z47.1 AFTERCARE FOLLOWING RIGHT KNEE JOINT REPLACEMENT SURGERY: ICD-10-CM

## 2022-11-15 DIAGNOSIS — Z47.1 AFTERCARE FOLLOWING RIGHT KNEE JOINT REPLACEMENT SURGERY: Primary | ICD-10-CM

## 2022-11-15 DIAGNOSIS — Z96.651 AFTERCARE FOLLOWING RIGHT KNEE JOINT REPLACEMENT SURGERY: Primary | ICD-10-CM

## 2022-11-15 DIAGNOSIS — Z96.651 AFTERCARE FOLLOWING RIGHT KNEE JOINT REPLACEMENT SURGERY: ICD-10-CM

## 2022-11-15 PROBLEM — M17.11 PRIMARY OSTEOARTHRITIS OF RIGHT KNEE: Status: RESOLVED | Noted: 2019-06-18 | Resolved: 2022-11-15

## 2022-11-15 NOTE — PROGRESS NOTES
Assessment:     1  Aftercare following right knee joint replacement surgery        Plan:     Problem List Items Addressed This Visit        Other    Aftercare following right knee joint replacement surgery - Primary     Findings today are consistent with status post right total knee arthroplasty  Imaging and prognosis was reviewed with the patient today  Patient overall is doing well  Discussed that it can take up to a year to see full benefits from the surgery  Patient should continue HEP plan  Patient will call the office for antibiotics for any dental procedures  Patient will follow up in 6 months  All patient's questions were answered to his satisfaction  This note is created using dictation transcription  It may contain typographical errors, grammatical errors, improperly dictated words, background noise and other errors  Relevant Orders    XR knee 3 vw right non injury         Subjective:     Patient ID: Halima Velásquez is a 58 y o  male  Chief Complaint:  58year old patient presents status post right total knee arthroplasty on 5/16/2022  Patient states overall he is doing well  He occasionally gets a slight pain through lateral knee, but it infrequent in timing  Patient is not having to take anything for pain  Patient states that the clicking that was occurring in his knee has subsided  He does state that he does not feel confident on stairs and uses the banisters for support  He has bee completing his HEP  Patient is very pleased with the outcome      Allergy:  Allergies   Allergen Reactions   • Infliximab Anaphylaxis     Medications:  all current active meds have been reviewed  Past Medical History:  Past Medical History:   Diagnosis Date   • Arthritis    • CPAP (continuous positive airway pressure) dependence    • Diabetes mellitus (HCC)    • Gout    • Hyperlipidemia    • Hypertension    • Peripheral neuropathy    • PONV (postoperative nausea and vomiting)    • Primary osteoarthritis of right knee 2019   • Rheumatoid arthritis ([facility] [address] )    • Seizures ([facility] [address] )    • Sleep apnea      Past Surgical History:  Past Surgical History:   Procedure Laterality Date   • COLONOSCOPY     • HAND SURGERY Left 2019   • KNEE ARTHROSCOPY Right     x 2   • [place] KNEE SCOPE,MED/LAT MENISECTOMY Right 2019    Procedure: ARTHROSCOPY KNEE, RIGHT PARTIAL MEDIAL MENISCECTOMY,RESECTION ANTEROMEDIAL PLICA;  Surgeon: Elijah Patel MD;  Location:  MAIN OR;  Service: Orthopedics   • ID TOTAL KNEE ARTHROPLASTY Right 2022    Procedure: ARTHROPLASTY KNEE TOTAL;  Surgeon: Elijah Patel MD;  Location:  MAIN OR;  Service: Orthopedics     Family History:  Family History   Problem Relation Age of Onset   • Diabetes Father    • Heart disease Father    • Hypertension Father      Social History:  Social History     Substance and Sexual Activity   Alcohol Use Yes   • Alcohol/week: 1 0 standard drink   • Types: 1 Standard drinks or equivalent per week     Social History     Substance and Sexual Activity   Drug Use Never     Social History     Tobacco Use   Smoking Status Former Smoker   • Packs/day: 2 00   • Years: 25 00   • Pack years: 50 00   • Quit date:    • Years since quittin 8   Smokeless Tobacco Never Used     Review of Systems   Constitutional: Negative for chills and fever  HENT: Negative for ear pain and sore throat  Eyes: Negative for pain and visual disturbance  Respiratory: Negative for cough and shortness of breath  Cardiovascular: Negative for chest pain and palpitations  Gastrointestinal: Negative for abdominal pain and vomiting  Genitourinary: Negative for dysuria and hematuria  Musculoskeletal: Positive for joint swelling (Right knee)  Negative for arthralgias (right knee), back pain and gait problem  Skin: Negative for color change and rash  Neurological: Negative for seizures and syncope  Psychiatric/Behavioral: Negative  All other systems reviewed and are negative  Objective:  BP Readings from Last 1 Encounters:   11/15/22 122/80      Wt Readings from Last 1 Encounters:   11/15/22 119 kg (262 lb)      BMI:   Estimated body mass index is 36 54 kg/m² as calculated from the following:    Height as of this encounter: 5' 11" (1 803 m)  Weight as of this encounter: 119 kg (262 lb)  BSA:   Estimated body surface area is 2 37 meters squared as calculated from the following:    Height as of this encounter: 5' 11" (1 803 m)  Weight as of this encounter: 119 kg (262 lb)  Physical Exam  Vitals and nursing note reviewed  Constitutional:       Appearance: Normal appearance  He is well-developed  HENT:      Head: Normocephalic and atraumatic  Right Ear: External ear normal       Left Ear: External ear normal    Eyes:      Extraocular Movements: Extraocular movements intact  Conjunctiva/sclera: Conjunctivae normal    Pulmonary:      Effort: Pulmonary effort is normal    Musculoskeletal:      Cervical back: Neck supple  Right knee: No effusion  Skin:     General: Skin is warm and dry  Neurological:      Mental Status: He is alert and oriented to person, place, and time  Psychiatric:         Mood and Affect: Mood normal          Behavior: Behavior normal        Right Knee Exam     Muscle Strength   The patient has normal right knee strength  Tenderness   The patient is experiencing no tenderness  Range of Motion   Extension: 0   Flexion: 120     Tests   Varus: negative Valgus: negative  Patellar apprehension: negative    Other   Erythema: absent  Scars: present  Sensation: normal  Pulse: present  Swelling: none  Effusion: no effusion present            I have personally reviewed pertinent films in PACS and my interpretation is XR of right knee demonstrates a stable and well aligned prothesis from right TKA       Scribe Attestation    I,:  Alma Wolf am acting as a scribe while in the presence of the attending physician :       I,:  Sal Preciado MD personally performed the services described in this documentation    as scribed in my presence :

## 2022-11-15 NOTE — ASSESSMENT & PLAN NOTE
Findings today are consistent with status post right total knee arthroplasty  Imaging and prognosis was reviewed with the patient today  Patient overall is doing well  Discussed that it can take up to a year to see full benefits from the surgery  Patient should continue HEP plan  Patient will call the office for antibiotics for any dental procedures  Patient will follow up in 6 months  All patient's questions were answered to his satisfaction  This note is created using dictation transcription  It may contain typographical errors, grammatical errors, improperly dictated words, background noise and other errors

## 2023-01-23 ENCOUNTER — TELEPHONE (OUTPATIENT)
Dept: OBGYN CLINIC | Facility: CLINIC | Age: 63
End: 2023-01-23

## 2023-01-23 DIAGNOSIS — Z96.651 AFTERCARE FOLLOWING RIGHT KNEE JOINT REPLACEMENT SURGERY: Primary | ICD-10-CM

## 2023-01-23 DIAGNOSIS — Z47.1 AFTERCARE FOLLOWING RIGHT KNEE JOINT REPLACEMENT SURGERY: Primary | ICD-10-CM

## 2023-01-23 RX ORDER — AMOXICILLIN 500 MG/1
CAPSULE ORAL
Qty: 4 CAPSULE | Refills: 4 | Status: SHIPPED | OUTPATIENT
Start: 2023-01-23 | End: 2023-01-23

## 2023-01-23 NOTE — TELEPHONE ENCOUNTER
Caller: Jorge Alberto Crawford (Wife)     Doctor: Sana Evans     Reason for call: Dentist appt on 1/30/23 , will need antibiotics prior due to right knee replacement  Pharmacy: [facility]/pharmacy [address] [place] [address]      Call back#: [phone]

## 2023-01-25 NOTE — TELEPHONE ENCOUNTER
Left message with wife Nneka Ellison stating prescription called into pharmacy with refills for future dentist appointments

## 2023-05-16 ENCOUNTER — APPOINTMENT (OUTPATIENT)
Dept: RADIOLOGY | Facility: CLINIC | Age: 63
End: 2023-05-16

## 2023-05-16 ENCOUNTER — OFFICE VISIT (OUTPATIENT)
Dept: OBGYN CLINIC | Facility: CLINIC | Age: 63
End: 2023-05-16

## 2023-05-16 VITALS
SYSTOLIC BLOOD PRESSURE: 138 MMHG | BODY MASS INDEX: 35.42 KG/M2 | WEIGHT: 253 LBS | DIASTOLIC BLOOD PRESSURE: 81 MMHG | HEIGHT: 71 IN

## 2023-05-16 DIAGNOSIS — Z47.1 AFTERCARE FOLLOWING RIGHT KNEE JOINT REPLACEMENT SURGERY: ICD-10-CM

## 2023-05-16 DIAGNOSIS — Z47.1 AFTERCARE FOLLOWING RIGHT KNEE JOINT REPLACEMENT SURGERY: Primary | ICD-10-CM

## 2023-05-16 DIAGNOSIS — Z96.651 AFTERCARE FOLLOWING RIGHT KNEE JOINT REPLACEMENT SURGERY: Primary | ICD-10-CM

## 2023-05-16 DIAGNOSIS — Z96.651 AFTERCARE FOLLOWING RIGHT KNEE JOINT REPLACEMENT SURGERY: ICD-10-CM

## 2023-05-16 RX ORDER — LEFLUNOMIDE 20 MG/1
20 TABLET ORAL DAILY
COMMUNITY
Start: 2023-04-27

## 2023-05-16 RX ORDER — NITROGLYCERIN 0.4 MG/1
TABLET SUBLINGUAL
COMMUNITY

## 2023-05-16 RX ORDER — AMLODIPINE BESYLATE 5 MG/1
5 TABLET ORAL DAILY
COMMUNITY
Start: 2023-04-20

## 2023-05-16 RX ORDER — APIXABAN 5 MG/1
5 TABLET, FILM COATED ORAL 2 TIMES DAILY
COMMUNITY
Start: 2023-03-09

## 2023-05-16 RX ORDER — ABATACEPT 125 MG/ML
INJECTION, SOLUTION SUBCUTANEOUS
COMMUNITY

## 2023-05-16 RX ORDER — METOPROLOL SUCCINATE 50 MG/1
50 TABLET, EXTENDED RELEASE ORAL DAILY
COMMUNITY
Start: 2023-04-20

## 2023-05-16 RX ORDER — VALSARTAN 80 MG/1
80 TABLET ORAL DAILY
COMMUNITY
Start: 2023-04-20

## 2023-05-16 RX ORDER — ROSUVASTATIN CALCIUM 40 MG/1
40 TABLET, COATED ORAL DAILY
COMMUNITY
Start: 2023-05-04

## 2023-05-16 NOTE — PROGRESS NOTES
Assessment:     1  Aftercare following right knee joint replacement surgery        Plan:     Problem List Items Addressed This Visit        Other    Aftercare following right knee joint replacement surgery - Sera Carter 31 is doing very well status post right total knee arthroplasty on May 16, 2022  X-rays were reviewed with the patient and his wife that reveal a well aligned prosthesis with no evidence of loosening  Discussed that the soft tissue swelling that he has is most likely from the rheumatoid arthritis  It is not causing pain or affecting his daily activities  Continue low impact exercises  Follow-up in 2 to 3 years with repeat x-rays to check prosthesis  All patient's questions were answered to his satisfaction  This note is created using dictation transcription  It may contain typographical errors, grammatical errors, improperly dictated words, background noise and other errors  Relevant Orders    XR knee 3 vw right non injury      Subjective:     Patient ID: Gregory Bender is a 61 y o  male  Chief Complaint:  61year old patient presents status post right total knee arthroplasty on 5/16/2022  Doing very well regarding the right knee  He denies any pain and he is back doing his normal activities with no issues  He did recently have his left knee aspirated and injected with cortisone by the rheumatologist   He feels significant relief after that procedure      Allergy:  Allergies   Allergen Reactions   • Infliximab Anaphylaxis     Medications:  all current active meds have been reviewed  Past Medical History:  Past Medical History:   Diagnosis Date   • Arthritis    • CPAP (continuous positive airway pressure) dependence    • Diabetes mellitus (HCC)    • Gout    • Hyperlipidemia    • Hypertension    • Peripheral neuropathy    • PONV (postoperative nausea and vomiting)    • Primary osteoarthritis of right knee 6/18/2019   • Rheumatoid arthritis (HCC)    • Seizures ([facility])    • Sleep apnea Past Surgical History:  Past Surgical History:   Procedure Laterality Date   • COLONOSCOPY     • HAND SURGERY Left 2019   • KNEE ARTHROSCOPY Right     x 2   • CT ARTHRP KNE CONDYLE&PLATU MEDIAL&LAT COMPARTMENTS Right 2022    Procedure: ARTHROPLASTY KNEE TOTAL;  Surgeon: Hamida Singh MD;  Location:  MAIN OR;  Service: Orthopedics   • CT ARTHRS KNE SURG W/MENISCECTOMY MED/LAT W/SHVG Right 2019    Procedure: ARTHROSCOPY KNEE, RIGHT PARTIAL MEDIAL MENISCECTOMY,RESECTION ANTEROMEDIAL PLICA;  Surgeon: Hamida Singh MD;  Location:  MAIN OR;  Service: Orthopedics     Family History:  Family History   Problem Relation Age of Onset   • Diabetes Father    • Heart disease Father    • Hypertension Father      Social History:  Social History     Substance and Sexual Activity   Alcohol Use Yes   • Alcohol/week: 1 0 standard drink   • Types: 1 Standard drinks or equivalent per week     Social History     Substance and Sexual Activity   Drug Use Never     Social History     Tobacco Use   Smoking Status Former   • Packs/day: 2 00   • Years: 25 00   • Pack years: 50 00   • Types: Cigarettes   • Quit date:    • Years since quittin 3   Smokeless Tobacco Never     Review of Systems   Constitutional: Negative for chills and fever  HENT: Negative for ear pain and sore throat  Eyes: Negative for pain and visual disturbance  Respiratory: Negative for cough and shortness of breath  Cardiovascular: Negative for chest pain and palpitations  Gastrointestinal: Negative for abdominal pain and vomiting  Genitourinary: Negative for dysuria and hematuria  Musculoskeletal: Negative for arthralgias, back pain, gait problem and joint swelling  Skin: Negative for color change and rash  Neurological: Negative for seizures and syncope  Psychiatric/Behavioral: Negative  All other systems reviewed and are negative          Objective:  BP Readings from Last 1 Encounters:   23 138/81      Wt Readings "from Last 1 Encounters:   05/16/23 115 kg (253 lb)      BMI:   Estimated body mass index is 35 29 kg/m² as calculated from the following:    Height as of this encounter: 5' 11\" (1 803 m)  Weight as of this encounter: 115 kg (253 lb)  BSA:   Estimated body surface area is 2 33 meters squared as calculated from the following:    Height as of this encounter: 5' 11\" (1 803 m)  Weight as of this encounter: 115 kg (253 lb)  Physical Exam  Vitals and nursing note reviewed  Constitutional:       Appearance: Normal appearance  He is well-developed  HENT:      Head: Normocephalic and atraumatic  Right Ear: External ear normal       Left Ear: External ear normal    Eyes:      Extraocular Movements: Extraocular movements intact  Conjunctiva/sclera: Conjunctivae normal    Pulmonary:      Effort: Pulmonary effort is normal    Musculoskeletal:      Cervical back: Neck supple  Right knee: No effusion  Skin:     General: Skin is warm and dry  Neurological:      Mental Status: He is alert and oriented to person, place, and time  Psychiatric:         Mood and Affect: Mood normal          Behavior: Behavior normal        Right Knee Exam     Muscle Strength   The patient has normal right knee strength  Tenderness   The patient is experiencing no tenderness  Range of Motion   Extension: 0   Flexion: 120     Tests   Varus: negative Valgus: negative  Patellar apprehension: negative    Other   Erythema: absent  Scars: present (well healed incision)  Sensation: normal  Pulse: present  Swelling: mild  Effusion: no effusion present            I have personally reviewed pertinent films in PACS and my interpretation is X-rays of the right knee reveal a well aligned prosthesis with no evidence of loosening       Scribe Attestation    I,:  Pedro Moe PA-C am acting as a scribe while in the presence of the attending physician :       I,:  Wendy Dockery MD personally performed the services described in this " documentation    as scribed in my presence :

## 2023-05-16 NOTE — ASSESSMENT & PLAN NOTE
Madeline Michaud is doing very well status post right total knee arthroplasty on May 16, 2022  X-rays were reviewed with the patient and his wife that reveal a well aligned prosthesis with no evidence of loosening  Discussed that the soft tissue swelling that he has is most likely from the rheumatoid arthritis  It is not causing pain or affecting his daily activities  Continue low impact exercises  Follow-up in 2 to 3 years with repeat x-rays to check prosthesis  All patient's questions were answered to his satisfaction  This note is created using dictation transcription  It may contain typographical errors, grammatical errors, improperly dictated words, background noise and other errors

## 2024-03-22 ENCOUNTER — HOSPITAL ENCOUNTER (OUTPATIENT)
Dept: HOSPITAL 99 - RAD | Age: 64
End: 2024-03-22
Payer: COMMERCIAL

## 2024-03-22 DIAGNOSIS — R05.3: ICD-10-CM

## 2024-03-22 DIAGNOSIS — J10.1: Primary | ICD-10-CM

## 2024-03-27 ENCOUNTER — HOSPITAL ENCOUNTER (OUTPATIENT)
Dept: HOSPITAL 99 - HWRAD | Age: 64
End: 2024-03-27
Payer: COMMERCIAL

## 2024-03-27 DIAGNOSIS — R04.2: Primary | ICD-10-CM

## 2024-03-27 DIAGNOSIS — J11.1: ICD-10-CM

## 2024-03-27 DIAGNOSIS — R79.81: ICD-10-CM

## 2024-05-10 ENCOUNTER — HOSPITAL ENCOUNTER (OUTPATIENT)
Dept: HOSPITAL 99 - RAD | Age: 64
End: 2024-05-10
Payer: COMMERCIAL

## 2024-05-10 DIAGNOSIS — J18.9: Primary | ICD-10-CM

## 2024-07-11 ENCOUNTER — APPOINTMENT (OUTPATIENT)
Dept: RADIOLOGY | Facility: CLINIC | Age: 64
End: 2024-07-11
Payer: COMMERCIAL

## 2024-07-11 ENCOUNTER — OFFICE VISIT (OUTPATIENT)
Dept: OBGYN CLINIC | Facility: CLINIC | Age: 64
End: 2024-07-11
Payer: COMMERCIAL

## 2024-07-11 VITALS
SYSTOLIC BLOOD PRESSURE: 128 MMHG | HEIGHT: 71 IN | DIASTOLIC BLOOD PRESSURE: 82 MMHG | BODY MASS INDEX: 35.98 KG/M2 | WEIGHT: 257 LBS

## 2024-07-11 DIAGNOSIS — M54.2 NECK PAIN: ICD-10-CM

## 2024-07-11 DIAGNOSIS — M75.81 RIGHT ROTATOR CUFF TENDINITIS: ICD-10-CM

## 2024-07-11 DIAGNOSIS — M19.011 PRIMARY OSTEOARTHRITIS OF RIGHT SHOULDER: Primary | ICD-10-CM

## 2024-07-11 DIAGNOSIS — M25.511 ACUTE PAIN OF RIGHT SHOULDER: ICD-10-CM

## 2024-07-11 PROCEDURE — 73030 X-RAY EXAM OF SHOULDER: CPT

## 2024-07-11 PROCEDURE — 99213 OFFICE O/P EST LOW 20 MIN: CPT | Performed by: ORTHOPAEDIC SURGERY

## 2024-07-11 PROCEDURE — 72050 X-RAY EXAM NECK SPINE 4/5VWS: CPT

## 2024-07-11 PROCEDURE — 20610 DRAIN/INJ JOINT/BURSA W/O US: CPT | Performed by: ORTHOPAEDIC SURGERY

## 2024-07-11 RX ORDER — PREDNISONE 5 MG/1
5 TABLET ORAL DAILY
COMMUNITY

## 2024-07-11 RX ORDER — UPADACITINIB 15 MG/1
TABLET, EXTENDED RELEASE ORAL
COMMUNITY
Start: 2024-06-06

## 2024-07-11 RX ORDER — LIDOCAINE HYDROCHLORIDE 10 MG/ML
5 INJECTION, SOLUTION INFILTRATION; PERINEURAL
Status: COMPLETED | OUTPATIENT
Start: 2024-07-11 | End: 2024-07-11

## 2024-07-11 RX ORDER — BETAMETHASONE SODIUM PHOSPHATE AND BETAMETHASONE ACETATE 3; 3 MG/ML; MG/ML
6 INJECTION, SUSPENSION INTRA-ARTICULAR; INTRALESIONAL; INTRAMUSCULAR; SOFT TISSUE
Status: COMPLETED | OUTPATIENT
Start: 2024-07-11 | End: 2024-07-11

## 2024-07-11 RX ADMIN — BETAMETHASONE SODIUM PHOSPHATE AND BETAMETHASONE ACETATE 6 MG: 3; 3 INJECTION, SUSPENSION INTRA-ARTICULAR; INTRALESIONAL; INTRAMUSCULAR; SOFT TISSUE at 11:00

## 2024-07-11 RX ADMIN — LIDOCAINE HYDROCHLORIDE 5 ML: 10 INJECTION, SOLUTION INFILTRATION; PERINEURAL at 11:00

## 2024-08-05 ENCOUNTER — TELEPHONE (OUTPATIENT)
Dept: OBGYN CLINIC | Facility: CLINIC | Age: 64
End: 2024-08-05

## 2024-08-05 NOTE — TELEPHONE ENCOUNTER
Pt is asking if he still has to take the antibiotic before a dental procedure since it is over a year since he had his surgery. Please call pt back    Reason for call:   [x] Refill   [] Prior Auth  [] Other:     Office:   [] PCP/Provider -   [x] Specialty/Provider - Ortho    Medication: amoxicillin (AMOXIL) 500 mg capsule Four tabs p.o. 1 hour prior to dental procedure       Pharmacy: [facility] #[id number] - [place], [place] - [address]      Does the patient have enough for 3 days?   [] Yes   [x] No - Send as HP to POD

## 2024-08-14 ENCOUNTER — TELEPHONE (OUTPATIENT)
Dept: OBGYN CLINIC | Facility: CLINIC | Age: 64
End: 2024-08-14

## 2024-08-27 ENCOUNTER — OFFICE VISIT (OUTPATIENT)
Dept: OBGYN CLINIC | Facility: CLINIC | Age: 64
End: 2024-08-27
Payer: COMMERCIAL

## 2024-08-27 VITALS
BODY MASS INDEX: 36.68 KG/M2 | HEIGHT: 71 IN | DIASTOLIC BLOOD PRESSURE: 72 MMHG | WEIGHT: 262 LBS | SYSTOLIC BLOOD PRESSURE: 120 MMHG

## 2024-08-27 DIAGNOSIS — M75.81 RIGHT ROTATOR CUFF TENDINITIS: ICD-10-CM

## 2024-08-27 DIAGNOSIS — M66.321 NONTRAUMATIC RUPTURE OF RIGHT LONG HEAD BICEPS TENDON: ICD-10-CM

## 2024-08-27 DIAGNOSIS — M19.011 PRIMARY OSTEOARTHRITIS OF RIGHT SHOULDER: Primary | ICD-10-CM

## 2024-08-27 PROCEDURE — 99213 OFFICE O/P EST LOW 20 MIN: CPT | Performed by: ORTHOPAEDIC SURGERY

## 2024-08-27 NOTE — PROGRESS NOTES
Assessment:     1. Primary osteoarthritis of right shoulder    2. Right rotator cuff tendinitis    3. Nontraumatic rupture of right long head biceps tendon      Plan:     Problem List Items Addressed This Visit          Musculoskeletal and Integument    Primary osteoarthritis of right shoulder - Primary    Right rotator cuff tendinitis    Nontraumatic rupture of right long head biceps tendon    Findings are consistent with right rotator cuff tendinitis, osteoarthritis of right shoulder, and acute, nontraumatic long bicep tendon rupture.  Findings and treatment options were discussed with the patient.  Fortunately, the physical therapy has helped improve his shoulder pain significantly.  Discussed that most likely his long head biceps tendon may have been partially torn and the exercises to the right shoulder may have helped complete the tear.  Discussed that the swelling and bruising will improve with time.  He should continue the home exercises for the right shoulder.  No further treatment recommended for the biceps tendon rupture.  Discussed he will only lose about 5% of his strength overall.  Follow-up as needed if any problems arise.  All patient's questions were answered to his satisfaction.  This note is created using dictation transcription.  It may contain typographical errors, grammatical errors, improperly dictated words, background noise and other errors.    Subjective:     Patient ID: Wayne Pompei is a 64 y.o. male.  Chief Complaint:  This is a 64-year-old white male following up for right shoulder osteoarthritis and tendinitis.  He was last seen about 6 weeks ago and given a subacromial cortisone injection.  He has been attending physical therapy at [facility].  He did not feel much relief the cortisone injection.  He did feel significant improvement with the physical therapy sessions.  Then about 1 week ago he felt sudden sharp pain in the right shoulder which was followed by relief.  He then  developed swelling and ecchymosis throughout the upper arm with a deformity in the biceps.  He does not remember any specific activity he was doing at the time when he felt the sudden pain.  Since then he has mild pain around the right shoulder.  Most of his discomfort is around the biceps.    Allergy:  Allergies   Allergen Reactions    Infliximab Anaphylaxis     Medications:  all current active meds have been reviewed  Past Medical History:  Past Medical History:   Diagnosis Date    Arthritis     CPAP (continuous positive airway pressure) dependence     Diabetes mellitus (HCC)     Gout     Hyperlipidemia     Hypertension     Peripheral neuropathy     PONV (postoperative nausea and vomiting)     Primary osteoarthritis of right knee 6/18/2019    Rheumatoid arthritis (HCC)     Seizures (HCC)     Sleep apnea      Past Surgical History:  Past Surgical History:   Procedure Laterality Date    COLONOSCOPY      HAND SURGERY Left 02/2019    KNEE ARTHROSCOPY Right     x 2    [place] ARTHRP KNE CONDYLE&PLATU MEDIAL&LAT COMPARTMENTS Right 5/16/2022    Procedure: ARTHROPLASTY KNEE TOTAL;  Surgeon: Ciera Gonzalez MD;  Location:  MAIN OR;  Service: Orthopedics    [place] ARTHRS KNE SURG W/MENISCECTOMY MED/LAT W/SHVG Right 8/9/2019    Procedure: ARTHROSCOPY KNEE, RIGHT PARTIAL MEDIAL MENISCECTOMY,RESECTION ANTEROMEDIAL PLICA;  Surgeon: Ciera Gonzalez MD;  Location:  MAIN OR;  Service: Orthopedics     Family History:  Family History   Problem Relation Age of Onset    Diabetes Father     Heart disease Father     Hypertension Father      Social History:  Social History     Substance and Sexual Activity   Alcohol Use Yes    Alcohol/week: 1.0 standard drink of alcohol    Types: 1 Standard drinks or equivalent per week     Social History     Substance and Sexual Activity   Drug Use Never     Social History     Tobacco Use   Smoking Status Former    Current packs/day: 0.00    Average packs/day: 2.0 packs/day for 25.0 years (50.0 ttl pk-yrs)     "Types: Cigarettes    Start date:     Quit date:     Years since quittin.6   Smokeless Tobacco Never     Review of Systems   Constitutional:  Negative for chills, fever and unexpected weight change.   HENT:  Negative for hearing loss, nosebleeds and sore throat.    Eyes:  Negative for pain, redness and visual disturbance.   Respiratory:  Negative for cough, shortness of breath and wheezing.    Cardiovascular:  Negative for chest pain, palpitations and leg swelling.   Gastrointestinal:  Negative for abdominal pain, nausea and vomiting.   Endocrine: Negative for polyphagia and polyuria.   Genitourinary:  Negative for dysuria and hematuria.   Musculoskeletal:  Positive for arthralgias (right shoulder).        See HPI   Skin:  Negative for rash and wound.   Neurological:  Negative for dizziness, numbness and headaches.   Psychiatric/Behavioral:  Negative for decreased concentration and suicidal ideas. The patient is not nervous/anxious.          Objective:  BP Readings from Last 1 Encounters:   24 120/72      Wt Readings from Last 1 Encounters:   24 119 kg (262 lb)      BMI:   Estimated body mass index is 36.54 kg/m² as calculated from the following:    Height as of this encounter: 5' 11\" (1.803 m).    Weight as of this encounter: 119 kg (262 lb).  BSA:   Estimated body surface area is 2.37 meters squared as calculated from the following:    Height as of this encounter: 5' 11\" (1.803 m).    Weight as of this encounter: 119 kg (262 lb).   Physical Exam  Vitals and nursing note reviewed.   Constitutional:       Appearance: Normal appearance. He is well-developed.   HENT:      Head: Normocephalic and atraumatic.      Right Ear: External ear normal.      Left Ear: External ear normal.      Nose: Nose normal.   Eyes:      General: No scleral icterus.     Extraocular Movements: Extraocular movements intact.      Conjunctiva/sclera: Conjunctivae normal.   Cardiovascular:      Rate and Rhythm: Normal " rate.   Pulmonary:      Effort: Pulmonary effort is normal. No respiratory distress.   Musculoskeletal:      Cervical back: Normal range of motion and neck supple.      Comments: See Ortho exam   Skin:     General: Skin is warm and dry.   Neurological:      General: No focal deficit present.      Mental Status: He is alert and oriented to person, place, and time.   Psychiatric:         Behavior: Behavior normal.       Right Shoulder Exam     Tenderness   The patient is experiencing tenderness in the biceps tendon (biceps muscle belly).    Range of Motion   Active abduction:  170   External rotation:  40 (with pain)   Forward flexion:  180   Internal rotation 0 degrees:  Sacrum     Muscle Strength   Abduction: 5/5   Internal rotation: 5/5   External rotation: 5/5   Biceps: 5/5     Tests   Cross arm: negative  Drop arm: negative    Other   Erythema: absent  Scars: absent  Sensation: normal  Pulse: present    Comments:    Peter deformity  Diffuse swelling and ecchymosis over biceps muscle belly            No new imaging.    Scribe Attestation      I,:  Celestina Farooq PA-C am acting as a scribe while in the presence of the attending physician.:       I,:  Ciera Gonzalez MD personally performed the services described in this documentation    as scribed in my presence.:

## 2024-10-17 ENCOUNTER — OFFICE VISIT (OUTPATIENT)
Dept: OBGYN CLINIC | Facility: CLINIC | Age: 64
End: 2024-10-17
Payer: COMMERCIAL

## 2024-10-17 VITALS
WEIGHT: 267 LBS | BODY MASS INDEX: 37.38 KG/M2 | HEIGHT: 71 IN | DIASTOLIC BLOOD PRESSURE: 70 MMHG | SYSTOLIC BLOOD PRESSURE: 118 MMHG

## 2024-10-17 DIAGNOSIS — M66.321 NONTRAUMATIC RUPTURE OF RIGHT LONG HEAD BICEPS TENDON: Primary | ICD-10-CM

## 2024-10-17 DIAGNOSIS — M24.811 INTERNAL DERANGEMENT OF RIGHT SHOULDER: ICD-10-CM

## 2024-10-17 PROCEDURE — 99213 OFFICE O/P EST LOW 20 MIN: CPT | Performed by: ORTHOPAEDIC SURGERY

## 2024-10-17 NOTE — PROGRESS NOTES
Assessment:     1. Nontraumatic rupture of right long head biceps tendon    2. Internal derangement of right shoulder        Plan:     Problem List Items Addressed This Visit          Musculoskeletal and Integument    Nontraumatic rupture of right long head biceps tendon - Primary    Relevant Orders    MRI shoulder right wo contrast     Other Visit Diagnoses       Internal derangement of right shoulder        Relevant Orders    MRI shoulder right wo contrast         Findings are consistent with right rotator cuff tendinitis, osteoarthritis and nontraumatic long head biceps tendon rupture. Findings and treatment options discussed with patient. No new images at today's visit. We discussed due to continued pain in the right shoulder, I recommend getting an MRI to evaluate the rotator cuff. MRI was ordered at today's visit. Patient is in agreement with this plan. We discussed due to the torn biceps tendon, he has a slight loss in strength. 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 Follow up after MRI.  All patient's questions were answered to his satisfaction.  This note is created using dictation transcription.  It may contain typographical errors, grammatical errors, improperly dictated words, background noise and other errors.    Subjective:     Patient ID: Wayne Pompei is a 64 y.o. male.  Chief Complaint:  64 y.o. presents today for follow up for right shoulder rotator cuff tendinitis, osteoarthritis and nontraumatic long head of biceps tendon rupture. Since his last visit, he reports he has a return of his pain and soreness in the shoulder. He was doing better with physical therapy and transitioned to a home exercise plan. He notes he has not been keeping up with his home exercise plan. He notes an increase in his activity with painting at his house, which has required a fair amount of overhead painting. His pain is  over the anterolateral aspect of the shoulder. He denies any new trauma or injury to the shoulder. He denies any numbness and tingling.    Allergy:  Allergies   Allergen Reactions    Infliximab Anaphylaxis     Medications:  all current active meds have been reviewed  Past Medical History:  Past Medical History:   Diagnosis Date    Arthritis     CPAP (continuous positive airway pressure) dependence     Diabetes mellitus (HCC)     Gout     Hyperlipidemia     Hypertension     Peripheral neuropathy     PONV (postoperative nausea and vomiting)     Primary osteoarthritis of right knee 2019    Rheumatoid arthritis (HCC)     Seizures (HCC)     Sleep apnea      Past Surgical History:  Past Surgical History:   Procedure Laterality Date    COLONOSCOPY      HAND SURGERY Left 2019    KNEE ARTHROSCOPY Right     x 2    [place] ARTHRP KNE CONDYLE&PLATU MEDIAL&LAT COMPARTMENTS Right 2022    Procedure: ARTHROPLASTY KNEE TOTAL;  Surgeon: Ciera Gonzalez MD;  Location:  MAIN OR;  Service: Orthopedics    VT ARTHRS KNE SURG W/MENISCECTOMY MED/LAT W/SHVG Right 2019    Procedure: ARTHROSCOPY KNEE, RIGHT PARTIAL MEDIAL MENISCECTOMY,RESECTION ANTEROMEDIAL PLICA;  Surgeon: Ciera Gonzalez MD;  Location:  MAIN OR;  Service: Orthopedics     Family History:  Family History   Problem Relation Age of Onset    Diabetes Father     Heart disease Father     Hypertension Father      Social History:  Social History     Substance and Sexual Activity   Alcohol Use Yes    Alcohol/week: 1.0 standard drink of alcohol    Types: 1 Standard drinks or equivalent per week     Social History     Substance and Sexual Activity   Drug Use Never     Social History     Tobacco Use   Smoking Status Former    Current packs/day: 0.00    Average packs/day: 2.0 packs/day for 25.0 years (50.0 ttl pk-yrs)    Types: Cigarettes    Start date:     Quit date:     Years since quittin.8   Smokeless Tobacco Never     Review of Systems   Constitutional:   "Negative for chills and fever.   HENT:  Negative for ear pain and sore throat.    Eyes:  Negative for pain and visual disturbance.   Respiratory:  Negative for cough and shortness of breath.    Cardiovascular:  Negative for chest pain and palpitations.   Gastrointestinal:  Negative for abdominal pain and vomiting.   Genitourinary:  Negative for dysuria and hematuria.   Musculoskeletal:  Negative for arthralgias and back pain.   Skin:  Negative for color change and rash.   Neurological:  Negative for seizures and syncope.   All other systems reviewed and are negative.        Objective:  BP Readings from Last 1 Encounters:   10/17/24 118/70      Wt Readings from Last 1 Encounters:   10/17/24 121 kg (267 lb)      BMI:   Estimated body mass index is 37.24 kg/m² as calculated from the following:    Height as of this encounter: 5' 11\" (1.803 m).    Weight as of this encounter: 121 kg (267 lb).  BSA:   Estimated body surface area is 2.38 meters squared as calculated from the following:    Height as of this encounter: 5' 11\" (1.803 m).    Weight as of this encounter: 121 kg (267 lb).   Physical Exam  Vitals and nursing note reviewed.   Constitutional:       Appearance: Normal appearance. He is well-developed.   HENT:      Head: Normocephalic and atraumatic.      Right Ear: External ear normal.      Left Ear: External ear normal.      Nose: Nose normal.   Eyes:      General: No scleral icterus.     Extraocular Movements: Extraocular movements intact.      Conjunctiva/sclera: Conjunctivae normal.   Cardiovascular:      Rate and Rhythm: Normal rate.   Pulmonary:      Effort: Pulmonary effort is normal. No respiratory distress.   Musculoskeletal:      Cervical back: Normal range of motion and neck supple.      Comments: See Ortho exam   Skin:     General: Skin is warm and dry.   Neurological:      General: No focal deficit present.      Mental Status: He is alert and oriented to person, place, and time.   Psychiatric:         " Behavior: Behavior normal.       Right Shoulder Exam     Range of Motion   The patient has normal right shoulder ROM.    Muscle Strength   The patient has normal right shoulder strength.    Tests   Urbano test: negative  Cross arm: negative  Impingement: negative    Other   Erythema: absent  Scars: absent  Sensation: normal  Pulse: present    Comments:  Peter deformity            No new images at today's visit.    Scribe Attestation      I,:  Reilly France am acting as a scribe while in the presence of the attending physician.:       I,:  Ciera Gonzalez MD personally performed the services described in this documentation    as scribed in my presence.:

## 2024-10-23 ENCOUNTER — HOSPITAL ENCOUNTER (INPATIENT)
Dept: HOSPITAL 99 - 4 WEST ACU | Age: 64
LOS: 1 days | Discharge: HOME | DRG: 101 | End: 2024-10-24
Payer: COMMERCIAL

## 2024-10-23 VITALS — DIASTOLIC BLOOD PRESSURE: 77 MMHG | SYSTOLIC BLOOD PRESSURE: 132 MMHG

## 2024-10-23 VITALS — RESPIRATION RATE: 16 BRPM | DIASTOLIC BLOOD PRESSURE: 80 MMHG | SYSTOLIC BLOOD PRESSURE: 145 MMHG

## 2024-10-23 VITALS — DIASTOLIC BLOOD PRESSURE: 87 MMHG | RESPIRATION RATE: 18 BRPM | SYSTOLIC BLOOD PRESSURE: 151 MMHG

## 2024-10-23 VITALS — RESPIRATION RATE: 16 BRPM

## 2024-10-23 VITALS — RESPIRATION RATE: 18 BRPM | SYSTOLIC BLOOD PRESSURE: 140 MMHG | DIASTOLIC BLOOD PRESSURE: 85 MMHG

## 2024-10-23 VITALS — DIASTOLIC BLOOD PRESSURE: 91 MMHG | RESPIRATION RATE: 15 BRPM | SYSTOLIC BLOOD PRESSURE: 136 MMHG

## 2024-10-23 VITALS — DIASTOLIC BLOOD PRESSURE: 80 MMHG | SYSTOLIC BLOOD PRESSURE: 130 MMHG

## 2024-10-23 VITALS — SYSTOLIC BLOOD PRESSURE: 129 MMHG | DIASTOLIC BLOOD PRESSURE: 78 MMHG

## 2024-10-23 VITALS — DIASTOLIC BLOOD PRESSURE: 75 MMHG | SYSTOLIC BLOOD PRESSURE: 128 MMHG

## 2024-10-23 VITALS — SYSTOLIC BLOOD PRESSURE: 131 MMHG | DIASTOLIC BLOOD PRESSURE: 74 MMHG

## 2024-10-23 VITALS — SYSTOLIC BLOOD PRESSURE: 137 MMHG | DIASTOLIC BLOOD PRESSURE: 83 MMHG | RESPIRATION RATE: 17 BRPM

## 2024-10-23 VITALS — DIASTOLIC BLOOD PRESSURE: 81 MMHG | SYSTOLIC BLOOD PRESSURE: 126 MMHG

## 2024-10-23 VITALS — SYSTOLIC BLOOD PRESSURE: 126 MMHG | DIASTOLIC BLOOD PRESSURE: 71 MMHG

## 2024-10-23 VITALS — DIASTOLIC BLOOD PRESSURE: 84 MMHG | SYSTOLIC BLOOD PRESSURE: 132 MMHG

## 2024-10-23 VITALS — BODY MASS INDEX: 36.7 KG/M2 | BODY MASS INDEX: 37.3 KG/M2

## 2024-10-23 VITALS — DIASTOLIC BLOOD PRESSURE: 87 MMHG | SYSTOLIC BLOOD PRESSURE: 137 MMHG

## 2024-10-23 DIAGNOSIS — Z79.84: ICD-10-CM

## 2024-10-23 DIAGNOSIS — Z79.899: ICD-10-CM

## 2024-10-23 DIAGNOSIS — E11.42: ICD-10-CM

## 2024-10-23 DIAGNOSIS — Z79.01: ICD-10-CM

## 2024-10-23 DIAGNOSIS — I48.0: ICD-10-CM

## 2024-10-23 DIAGNOSIS — Z92.3: ICD-10-CM

## 2024-10-23 DIAGNOSIS — Z79.52: ICD-10-CM

## 2024-10-23 DIAGNOSIS — E78.00: ICD-10-CM

## 2024-10-23 DIAGNOSIS — M10.9: ICD-10-CM

## 2024-10-23 DIAGNOSIS — M06.9: ICD-10-CM

## 2024-10-23 DIAGNOSIS — I10: ICD-10-CM

## 2024-10-23 DIAGNOSIS — Z96.651: ICD-10-CM

## 2024-10-23 DIAGNOSIS — G47.33: ICD-10-CM

## 2024-10-23 DIAGNOSIS — G40.909: Primary | ICD-10-CM

## 2024-10-23 LAB
ALBUMIN SERPL-MCNC: 4.8 G/DL (ref 3.5–5)
ALP SERPL-CCNC: 32 U/L (ref 38–126)
ALT SERPL-CCNC: 34 U/L (ref 0–50)
AST SERPL-CCNC: 43 U/L (ref 17–59)
BUN SERPL-MCNC: 18 MG/DL (ref 9–20)
CALCIUM SERPL-MCNC: 9.7 MG/DL (ref 8.4–10.2)
CHLORIDE SERPL-SCNC: 106 MMOL/L (ref 98–107)
CO2 SERPL-SCNC: 25 MMOL/L (ref 22–30)
EGFR: > 60
ERYTHROCYTE [DISTWIDTH] IN BLOOD BY AUTOMATED COUNT: 13.2 % (ref 11.5–14.5)
ESTIMATED CREATININE CLEARANCE: > 125 ML/MIN
GLUCOSE - POINT OF CARE: 187 MG/DL (ref 70–99)
GLUCOSE - POINT OF CARE: 202 MG/DL (ref 70–99)
GLUCOSE SERPL-MCNC: 176 MG/DL (ref 70–99)
HCT VFR BLD AUTO: 39 % (ref 39–52)
HGB BLD-MCNC: 13.1 G/DL (ref 13–18)
MAGNESIUM SERPL-MCNC: 2 MG/DL (ref 1.6–2.3)
MCHC RBC AUTO-ENTMCNC: 33.6 G/DL (ref 33–37)
MCV RBC AUTO: 99 FL (ref 80–94)
PLATELET # BLD AUTO: 150 10^3/UL (ref 130–400)
POTASSIUM SERPL-SCNC: 4.1 MMOL/L (ref 3.5–5.1)
PROT SERPL-MCNC: 6.9 G/DL (ref 6.3–8.2)
SODIUM SERPL-SCNC: 141 MMOL/L (ref 135–145)

## 2024-10-23 PROCEDURE — C9254 INJECTION, LACOSAMIDE: HCPCS

## 2024-10-23 RX ADMIN — APIXABAN 5 MG: 5 TABLET, FILM COATED ORAL at 23:24

## 2024-10-23 RX ADMIN — LACOSAMIDE 100 MG: 10 INJECTION INTRAVENOUS at 19:08

## 2024-10-23 RX ADMIN — LEVETIRACETAM 1000 MG: 100 INJECTION, SOLUTION, CONCENTRATE INTRAVENOUS at 16:32

## 2024-10-23 RX ADMIN — GABAPENTIN 900 MG: 300 CAPSULE ORAL at 23:24

## 2024-10-23 RX ADMIN — LEVETIRACETAM 1500 MG: 500 TABLET, FILM COATED ORAL at 23:24

## 2024-10-23 RX ADMIN — LORAZEPAM 0.5 MG: 2 INJECTION INTRAMUSCULAR; INTRAVENOUS at 16:06

## 2024-10-24 VITALS — SYSTOLIC BLOOD PRESSURE: 137 MMHG | HEART RATE: 63 BPM | DIASTOLIC BLOOD PRESSURE: 80 MMHG

## 2024-10-24 VITALS — RESPIRATION RATE: 16 BRPM | SYSTOLIC BLOOD PRESSURE: 123 MMHG | DIASTOLIC BLOOD PRESSURE: 78 MMHG

## 2024-10-24 VITALS — SYSTOLIC BLOOD PRESSURE: 143 MMHG | RESPIRATION RATE: 18 BRPM | DIASTOLIC BLOOD PRESSURE: 82 MMHG

## 2024-10-24 VITALS — SYSTOLIC BLOOD PRESSURE: 135 MMHG | DIASTOLIC BLOOD PRESSURE: 77 MMHG | RESPIRATION RATE: 16 BRPM

## 2024-10-24 LAB
GLUCOSE - POINT OF CARE: 116 MG/DL (ref 70–99)
GLUCOSE - POINT OF CARE: 213 MG/DL (ref 70–99)
HBA1C MFR BLD: 6.3 % (ref 4–5.6)

## 2024-10-24 RX ADMIN — LEVETIRACETAM 1500 MG: 500 TABLET, FILM COATED ORAL at 09:19

## 2024-10-24 RX ADMIN — ASPIRIN 81 MG: 81 TABLET, COATED ORAL at 09:06

## 2024-10-24 RX ADMIN — GABAPENTIN 900 MG: 300 CAPSULE ORAL at 12:39

## 2024-10-24 RX ADMIN — INSULIN ASPART 2 UNITS: 100 INJECTION, SOLUTION INTRAVENOUS; SUBCUTANEOUS at 12:39

## 2024-10-24 RX ADMIN — ALLOPURINOL 200 MG: 100 TABLET ORAL at 09:05

## 2024-10-24 RX ADMIN — AMLODIPINE BESYLATE 5 MG: 5 TABLET ORAL at 09:06

## 2024-10-24 RX ADMIN — INSULIN ASPART: 100 INJECTION, SOLUTION INTRAVENOUS; SUBCUTANEOUS at 07:21

## 2024-10-24 RX ADMIN — PREDNISONE 5 MG: 5 TABLET ORAL at 09:05

## 2024-10-24 RX ADMIN — GABAPENTIN 600 MG: 300 CAPSULE ORAL at 09:05

## 2024-10-24 RX ADMIN — VALSARTAN 80 MG: 80 TABLET, FILM COATED ORAL at 09:06

## 2024-10-24 RX ADMIN — LACOSAMIDE 100 MG: 100 TABLET, FILM COATED ORAL at 09:05

## 2024-10-24 RX ADMIN — APIXABAN 5 MG: 5 TABLET, FILM COATED ORAL at 09:06

## 2024-10-24 RX ADMIN — ACETAMINOPHEN 650 MG: 325 TABLET ORAL at 09:12

## 2024-10-24 RX ADMIN — ROSUVASTATIN CALCIUM 40 MG: 40 TABLET, FILM COATED ORAL at 09:05

## 2024-11-06 ENCOUNTER — HOSPITAL ENCOUNTER (OUTPATIENT)
Dept: MRI IMAGING | Facility: HOSPITAL | Age: 64
Discharge: HOME | End: 2024-11-06
Attending: ORTHOPAEDIC SURGERY
Payer: COMMERCIAL

## 2024-11-06 ENCOUNTER — HOSPITAL ENCOUNTER (OUTPATIENT)
Dept: RADIOLOGY | Facility: HOSPITAL | Age: 64
Discharge: HOME | End: 2024-11-06
Payer: COMMERCIAL

## 2024-11-06 DIAGNOSIS — M66.321 NONTRAUMATIC RUPTURE OF RIGHT LONG HEAD BICEPS TENDON: ICD-10-CM

## 2024-11-06 DIAGNOSIS — M24.811 INTERNAL DERANGEMENT OF RIGHT SHOULDER: ICD-10-CM

## 2024-11-06 PROCEDURE — 73221 MRI JOINT UPR EXTREM W/O DYE: CPT

## 2024-11-14 ENCOUNTER — OFFICE VISIT (OUTPATIENT)
Dept: OBGYN CLINIC | Facility: CLINIC | Age: 64
End: 2024-11-14
Payer: COMMERCIAL

## 2024-11-14 VITALS
HEIGHT: 71 IN | WEIGHT: 267 LBS | HEART RATE: 67 BPM | OXYGEN SATURATION: 97 % | SYSTOLIC BLOOD PRESSURE: 112 MMHG | BODY MASS INDEX: 37.38 KG/M2 | DIASTOLIC BLOOD PRESSURE: 70 MMHG

## 2024-11-14 DIAGNOSIS — S46.811A FULL THICKNESS TEAR OF RIGHT SUBSCAPULARIS TENDON, INITIAL ENCOUNTER: Primary | ICD-10-CM

## 2024-11-14 DIAGNOSIS — M66.321 NONTRAUMATIC RUPTURE OF RIGHT LONG HEAD BICEPS TENDON: ICD-10-CM

## 2024-11-14 PROCEDURE — 99213 OFFICE O/P EST LOW 20 MIN: CPT | Performed by: ORTHOPAEDIC SURGERY

## 2024-11-14 RX ORDER — DIAZEPAM 10 MG/100UL
SPRAY NASAL
COMMUNITY

## 2024-11-14 NOTE — PROGRESS NOTES
Assessment:     1. Full thickness tear of right subscapularis tendon, initial encounter    2. Nontraumatic rupture of right long head biceps tendon        Plan:     Problem List Items Addressed This Visit          Musculoskeletal and Integument    Full thickness tear of right subscapularis tendon - Primary    Relevant Orders    Ambulatory Referral to Orthopedic Surgery    Nontraumatic rupture of right long head biceps tendon    Relevant Orders    Ambulatory Referral to Orthopedic Surgery    Findings are consistent with right rotator cuff subscapularis full-thickness tear, osteoarthritis and nontraumatic long head biceps tendon partial rupture. Findings and treatment options discussed with patient.  I reviewed patient's right shoulder MRI with him and his wife.  I discussed prognosis of his shoulder condition.  Patient had tried conservative management and his shoulder continued to cause him pain.  I recommended patient to be seen by Dr. Mcadams for further evaluation and treatment recommendation.  All patient's questions were answered to his satisfaction.  This note is created using dictation transcription.  It may contain typographical errors, grammatical errors, improperly dictated words, background noise and other errors.    Subjective:     Patient ID: Wayne Pompei is a 64 y.o. male.  Chief Complaint:  64 y.o. presents today for follow up for right shoulder rotator cuff tendinitis, osteoarthritis and nontraumatic long head of biceps tendon rupture. Since his last visit, he reports he has a return of his pain and soreness in the shoulder. He was doing better with physical therapy and transitioned to a home exercise plan. He notes he has not been keeping up with his home exercise plan. He notes an increase in his activity with painting at his house, which has required a fair amount of overhead painting. His pain is over the anterolateral aspect of the shoulder. He denies any new trauma or injury to the shoulder. He  denies any numbness and tingling.  Patient is here to review his right shoulder MRI.    Allergy:  Allergies   Allergen Reactions    Infliximab Anaphylaxis     Medications:  all current active meds have been reviewed  Past Medical History:  Past Medical History:   Diagnosis Date    Arthritis     CPAP (continuous positive airway pressure) dependence     Diabetes mellitus (HCC)     Gout     Hyperlipidemia     Hypertension     Peripheral neuropathy     PONV (postoperative nausea and vomiting)     Primary osteoarthritis of right knee 2019    Rheumatoid arthritis (HCC)     Seizures (HCC)     Sleep apnea      Past Surgical History:  Past Surgical History:   Procedure Laterality Date    COLONOSCOPY      HAND SURGERY Left 2019    KNEE ARTHROSCOPY Right     x 2    [place] ARTHRP KNE CONDYLE&PLATU MEDIAL&LAT COMPARTMENTS Right 2022    Procedure: ARTHROPLASTY KNEE TOTAL;  Surgeon: Ciera Gonzalez MD;  Location:  MAIN OR;  Service: Orthopedics    [place] ARTHRS KNE SURG W/MENISCECTOMY MED/LAT W/SHVG Right 2019    Procedure: ARTHROSCOPY KNEE, RIGHT PARTIAL MEDIAL MENISCECTOMY,RESECTION ANTEROMEDIAL PLICA;  Surgeon: Ciera Gonzalez MD;  Location:  MAIN OR;  Service: Orthopedics     Family History:  Family History   Problem Relation Age of Onset    Diabetes Father     Heart disease Father     Hypertension Father      Social History:  Social History     Substance and Sexual Activity   Alcohol Use Yes    Alcohol/week: 1.0 standard drink of alcohol    Types: 1 Standard drinks or equivalent per week     Social History     Substance and Sexual Activity   Drug Use Never     Social History     Tobacco Use   Smoking Status Former    Current packs/day: 0.00    Average packs/day: 2.0 packs/day for 25.0 years (50.0 ttl pk-yrs)    Types: Cigarettes    Start date:     Quit date:     Years since quittin.8    Passive exposure: Past   Smokeless Tobacco Never     Review of Systems   Constitutional:  Negative for chills and  "fever.   HENT:  Negative for ear pain and sore throat.    Eyes:  Negative for pain and visual disturbance.   Respiratory:  Negative for cough and shortness of breath.    Cardiovascular:  Negative for chest pain and palpitations.   Gastrointestinal:  Negative for abdominal pain and vomiting.   Genitourinary:  Negative for dysuria and hematuria.   Musculoskeletal:  Positive for arthralgias (right shoulder). Negative for back pain.   Skin:  Negative for color change and rash.   Neurological:  Negative for seizures and syncope.   All other systems reviewed and are negative.        Objective:  BP Readings from Last 1 Encounters:   11/14/24 112/70      Wt Readings from Last 1 Encounters:   11/14/24 121 kg (267 lb)      BMI:   Estimated body mass index is 37.24 kg/m² as calculated from the following:    Height as of this encounter: 5' 11\" (1.803 m).    Weight as of this encounter: 121 kg (267 lb).  BSA:   Estimated body surface area is 2.38 meters squared as calculated from the following:    Height as of this encounter: 5' 11\" (1.803 m).    Weight as of this encounter: 121 kg (267 lb).   Physical Exam  Vitals and nursing note reviewed.   Constitutional:       Appearance: Normal appearance. He is well-developed.   HENT:      Head: Normocephalic and atraumatic.      Right Ear: External ear normal.      Left Ear: External ear normal.      Nose: Nose normal.   Eyes:      General: No scleral icterus.     Extraocular Movements: Extraocular movements intact.      Conjunctiva/sclera: Conjunctivae normal.   Pulmonary:      Effort: Pulmonary effort is normal. No respiratory distress.   Musculoskeletal:      Cervical back: Neck supple.      Comments: See Ortho exam   Skin:     General: Skin is warm and dry.   Neurological:      General: No focal deficit present.      Mental Status: He is alert and oriented to person, place, and time.   Psychiatric:         Mood and Affect: Mood normal.         Behavior: Behavior normal.       Right " Shoulder Exam     Tenderness   The patient is experiencing tenderness in the biceps tendon (Anterior).    Range of Motion   The patient has normal right shoulder ROM.    Muscle Strength   Abduction: 5/5   Internal rotation: 4/5   External rotation: 5/5   Biceps: 4/5     Tests   Urbano test: negative  Cross arm: negative  Impingement: negative  Drop arm: negative    Other   Erythema: absent  Scars: absent  Sensation: normal  Pulse: present    Comments:  Peter deformity            I reviewed patient's image in the PACS system, right shoulder MRI showed full-thickness near full width tear of the subscapularis with retraction to the glenohumeral joint.  Long head bicep tendon tear

## 2024-11-19 ENCOUNTER — HOSPITAL ENCOUNTER (OUTPATIENT)
Dept: HOSPITAL 99 - RAD | Age: 64
End: 2024-11-19
Payer: COMMERCIAL

## 2024-11-19 DIAGNOSIS — R06.02: Primary | ICD-10-CM

## 2024-12-05 ENCOUNTER — OFFICE VISIT (OUTPATIENT)
Dept: OBGYN CLINIC | Facility: CLINIC | Age: 64
End: 2024-12-05
Payer: COMMERCIAL

## 2024-12-05 VITALS
WEIGHT: 270.6 LBS | SYSTOLIC BLOOD PRESSURE: 127 MMHG | HEIGHT: 71 IN | BODY MASS INDEX: 37.88 KG/M2 | DIASTOLIC BLOOD PRESSURE: 84 MMHG | HEART RATE: 68 BPM

## 2024-12-05 DIAGNOSIS — M66.321 NONTRAUMATIC RUPTURE OF RIGHT LONG HEAD BICEPS TENDON: ICD-10-CM

## 2024-12-05 DIAGNOSIS — S46.811A FULL THICKNESS TEAR OF RIGHT SUBSCAPULARIS TENDON, INITIAL ENCOUNTER: Primary | ICD-10-CM

## 2024-12-05 PROCEDURE — 99214 OFFICE O/P EST MOD 30 MIN: CPT | Performed by: STUDENT IN AN ORGANIZED HEALTH CARE EDUCATION/TRAINING PROGRAM

## 2024-12-05 PROCEDURE — 20610 DRAIN/INJ JOINT/BURSA W/O US: CPT | Performed by: STUDENT IN AN ORGANIZED HEALTH CARE EDUCATION/TRAINING PROGRAM

## 2024-12-05 RX ORDER — LIDOCAINE HYDROCHLORIDE 10 MG/ML
4 INJECTION, SOLUTION INFILTRATION; PERINEURAL
Status: COMPLETED | OUTPATIENT
Start: 2024-12-05 | End: 2024-12-05

## 2024-12-05 RX ORDER — LACOSAMIDE 100 MG/1
100 TABLET ORAL
COMMUNITY
Start: 2024-10-24

## 2024-12-05 RX ORDER — TRIAMCINOLONE ACETONIDE 40 MG/ML
40 INJECTION, SUSPENSION INTRA-ARTICULAR; INTRAMUSCULAR
Status: COMPLETED | OUTPATIENT
Start: 2024-12-05 | End: 2024-12-05

## 2024-12-05 RX ORDER — ASPIRIN 81 MG/1
81 TABLET, CHEWABLE ORAL DAILY
COMMUNITY

## 2024-12-05 RX ADMIN — TRIAMCINOLONE ACETONIDE 40 MG: 40 INJECTION, SUSPENSION INTRA-ARTICULAR; INTRAMUSCULAR at 13:15

## 2024-12-05 RX ADMIN — LIDOCAINE HYDROCHLORIDE 4 ML: 10 INJECTION, SOLUTION INFILTRATION; PERINEURAL at 13:15

## 2024-12-05 NOTE — PROGRESS NOTES
Ortho Sports Medicine Shoulder New Patient Visit     Assesment:   64 y.o. male right shoulder full-thickness tear of subscapularis, high-grade tear of long head of biceps tendon.    Plan:    Kennedy is a pleasant 64-year-old male who presents to the clinic today for initial consultation of right shoulder full-thickness tear of subscapularis and tear of long head of the biceps.  After reviewing his history and imaging, as well as a thorough physical exam, he overall has very good range of motion with small strength deficits.  We did discuss surgical versus nonsurgical options at today's visit, which include a possible repair of the subscapularis tendon, possibly open repair due to severe medial retraction.  After discussing the risk and benefits of all these treatment options, Kennedy would like to move forward with conservative treatment.  I performed a anterior glenohumeral joint cortisone injection which Kennedy tolerated well with no immediate complications.  I did educate him that these injections cannot be repeated until minimally 3 months.  I also placed a referral for outpatient physical therapy in the event his home exercises are not helpful.  All the patient's questions and concerns were addressed at length at today's visit.  He will follow-up with me as needed.    Conservative treatment:    Ice to shoulder 1-2 times daily, for 20 minutes at a time.  PT for ROM and strengthening to shoulder, rotator cuff, scapular stabilizers.  Home exercise program for shoulder, including ROM and strenthening.  Instructions given to patient of what exercises to perform.      Imaging:    All imaging from today was reviewed by myself and explained to the patient.       Injection:    The risks and benefits of the injection (which include but are not limited to: infection, bleeding,damage to nerve/artery, need for further intervention), as well as the risks and benefits of all alternative treatments were explained and understood.   "The patient elected to proceed with injection. The procedure was done with aseptic technique, and the patient tolerated the procedure well with no complications.  A corticosteroid injection of the glenohumeral joint was performed.    Large joint arthrocentesis: R glenohumeral  Universal Protocol:  procedure performed by consultantConsent: Verbal consent obtained.  Risks and benefits: risks, benefits and alternatives were discussed  Consent given by: patient  Time out: Immediately prior to procedure a \"time out\" was called to verify the correct patient, procedure, equipment, support staff and site/side marked as required.  Timeout called at: 12/5/2024 1:57 PM.  Patient understanding: patient states understanding of the procedure being performed  Patient consent: the patient's understanding of the procedure matches consent given  Site marked: the operative site was marked  Patient identity confirmed: verbally with patient  Supporting Documentation  Indications: pain and diagnostic evaluation   Procedure Details  Location: shoulder - R glenohumeral  Preparation: Patient was prepped and draped in the usual sterile fashion  Needle size: 22 G  Ultrasound guidance: no  Approach: anterior  Medications administered: 40 mg triamcinolone acetonide 40 mg/mL; 4 mL lidocaine 1 %    Patient tolerance: patient tolerated the procedure well with no immediate complications        Surgery:     No surgery is recommended at this point, continue with conservative treatment plan as noted.      Follow up:    Return if symptoms worsen or fail to improve.        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 Ciera Gonzalez, seen in clinic for consultation of left shoulder pain.  He states that this pain began approximately 4 months ago when he was chopping wood " and laid in his bed to go to bed and woke up with extreme pain in his right shoulder.  He rates the pain as an 8 out of 10 and describes it as a sharp and stabbing sensation along the anterior aspect of the shoulder.  Since the time of his injury, he has seen my colleague Dr. Gonzalez where he received injections and Voltaren gel and completed physical therapy with no relief.  He states the pain is increased with overhead activity, pulling up his pants, as well as heavy lifting.  He also states that he has episodes of clicking in his shoulder with overhead activity.  He denies any numbness or tingling at today's visit.      Shoulder Surgical History:  None    Past Medical, Social and Family History:  Past Medical History:   Diagnosis Date    Arthritis     CPAP (continuous positive airway pressure) dependence     Diabetes mellitus (HCC)     Gout     Hyperlipidemia     Hypertension     Peripheral neuropathy     PONV (postoperative nausea and vomiting)     Primary osteoarthritis of right knee 6/18/2019    Rheumatoid arthritis (HCC)     Seizures (HCC)     Sleep apnea      Past Surgical History:   Procedure Laterality Date    COLONOSCOPY      HAND SURGERY Left 02/2019    KNEE ARTHROSCOPY Right     x 2    [place] ARTHRP KNE CONDYLE&PLATU MEDIAL&LAT COMPARTMENTS Right 5/16/2022    Procedure: ARTHROPLASTY KNEE TOTAL;  Surgeon: Ciera Gonzalez MD;  Location:  MAIN OR;  Service: Orthopedics    [place] ARTHRS KNE SURG W/MENISCECTOMY MED/LAT W/SHVG Right 8/9/2019    Procedure: ARTHROSCOPY KNEE, RIGHT PARTIAL MEDIAL MENISCECTOMY,RESECTION ANTEROMEDIAL PLICA;  Surgeon: Ciera Gonzalez MD;  Location:  MAIN OR;  Service: Orthopedics     Allergies   Allergen Reactions    Infliximab Anaphylaxis     Current Outpatient Medications on File Prior to Visit   Medication Sig Dispense Refill    allopurinol (ZYLOPRIM) 100 mg tablet Take 200 mg by mouth daily  3    amLODIPine (NORVASC) 5 mg tablet Take 5 mg by mouth daily      aspirin 81 mg chewable tablet  Chew 81 mg daily      BYDUREON 2 MG PEN INJECT INSULIN AS DIRECTED ONCE A WEEK SUBCUTANEOUS 30 DAYS  2    diazepam (VALIUM) 5 mg tablet Take 5 mg by mouth (Patient taking differently: Take 5 mg by mouth every 6 (six) hours as needed)      diazePAM (Valtoco 10 MG Dose) 10 MG/0.1ML LIQD into each nostril (Patient taking differently: into each nostril as needed)      Eliquis 5 MG Take 5 mg by mouth 2 (two) times a day      fexofenadine (ALLEGRA) 180 MG tablet Take 180 mg by mouth daily      gabapentin (NEURONTIN) 600 MG tablet Take 900 mg by mouth 2 (two) times a day      lacosamide (VIMPAT) 100 mg tablet 100 mg      leflunomide (ARAVA) 20 MG tablet Take 20 mg by mouth daily      levETIRAcetam (KEPPRA) 1000 MG tablet 1,500 mg 2 (two) times a day        metFORMIN (GLUCOPHAGE-XR) 500 mg 24 hr tablet TAKE 2 TABLETS BY MOUTH DAILY WITH EVENING MEAL  2    metoprolol succinate (TOPROL-XL) 50 mg 24 hr tablet Take 50 mg by mouth daily      Multiple Vitamin (MULTIVITAMIN) capsule Take 1 capsule by mouth daily      nitroglycerin (NITROSTAT) 0.4 mg SL tablet as directed Sublingual every 5 mins PRN      predniSONE 5 mg tablet Take 5 mg by mouth daily      Rinvoq 15 MG TB24       rosuvastatin (CRESTOR) 40 MG tablet Take 40 mg by mouth daily      valsartan (DIOVAN) 80 mg tablet Take 80 mg by mouth daily      [DISCONTINUED] Abatacept (Orencia) 125 MG/ML SOSY 1 ml Subcutaneous weekly for 30 days      [DISCONTINUED] acetaminophen (TYLENOL) 325 mg tablet Take 3 tablets (975 mg total) by mouth every 8 (eight) hours (Patient taking differently: Take 975 mg by mouth every 8 (eight) hours) 90 tablet 1    [DISCONTINUED] aspirin (ECOTRIN) 325 mg EC tablet Take 1 tablet (325 mg total) by mouth in the morning and 1 tablet (325 mg total) in the evening. 90 tablet 0    [DISCONTINUED] [facility] Vitamin C 500 MG tablet TAKE 1 TABLET BY MOUTH TWICE A DAY (Patient not taking: Reported on 11/14/2024) 180 tablet 0    [DISCONTINUED]  lisinopril-hydrochlorothiazide (PRINZIDE,ZESTORETIC) 10-12.5 MG per tablet Take 1 tablet by mouth daily  2    [DISCONTINUED] LIVALO 2 MG Take 4 mg by mouth daily   (Patient not taking: Reported on 2024)  5    [DISCONTINUED] Xeljanz XR 11 MG TB24 11 mg in the morning       No current facility-administered medications on file prior to visit.     Social History     Socioeconomic History    Marital status: /Civil Union     Spouse name: Not on file    Number of children: Not on file    Years of education: Not on file    Highest education level: Not on file   Occupational History    Not on file   Tobacco Use    Smoking status: Former     Current packs/day: 0.00     Average packs/day: 2.0 packs/day for 25.0 years (50.0 ttl pk-yrs)     Types: Cigarettes     Start date:      Quit date:      Years since quittin.9     Passive exposure: Past    Smokeless tobacco: Never   Vaping Use    Vaping status: Never Used   Substance and Sexual Activity    Alcohol use: Yes     Alcohol/week: 1.0 standard drink of alcohol     Types: 1 Standard drinks or equivalent per week    Drug use: Never    Sexual activity: Not Currently   Other Topics Concern    Not on file   Social History Narrative    Not on file     Social Drivers of Health     Financial Resource Strain: Not on file   Food Insecurity: No Food Insecurity (2022)    Hunger Vital Sign     Worried About Running Out of Food in the Last Year: Never true     Ran Out of Food in the Last Year: Never true   Transportation Needs: No Transportation Needs (2022)    PRAPARE - Transportation     Lack of Transportation (Medical): No     Lack of Transportation (Non-Medical): No   Physical Activity: Not on file   Stress: Not on file (2024)   Social Connections: Not on file   Intimate Partner Violence: Low Risk  (2021)    Received from [facility]    Intimate Partner Violence     Insults You: Not on file     Threatens You: Not on file     Screams at You:  "Not on file     Physically Hurt: Not on file     Intimate Partner Violence Score: Not on file   Housing Stability: Low Risk  (5/16/2022)    Housing Stability Vital Sign     Unable to Pay for Housing in the Last Year: No     Number of Places Lived in the Last Year: 1     Unstable Housing in the Last Year: No         I have reviewed the past medical, surgical, social and family history, medications and allergies as documented in the EMR.    Review of systems: ROS is negative other than that noted in the HPI.  Constitutional: Negative for fatigue and fever.   HENT: Negative for sore throat.    Respiratory: Negative for shortness of breath.    Cardiovascular: Negative for chest pain.   Gastrointestinal: Negative for abdominal pain.   Endocrine: Negative for cold intolerance and heat intolerance.   Genitourinary: Negative for flank pain.   Musculoskeletal: Negative for back pain.   Skin: Negative for rash.   Allergic/Immunologic: Negative for immunocompromised state.   Neurological: Negative for dizziness.   Psychiatric/Behavioral: Negative for agitation.      Physical Exam:    Blood pressure 127/84, pulse 68, height 5' 11\" (1.803 m), weight 123 kg (270 lb 9.6 oz).    General/Constitutional: NAD, well developed, well nourished  HENT: Normocephalic, atraumatic  CV: Intact distal pulses, regular rate  Resp: No respiratory distress or labored breathing  Lymphatic: No lymphadenopathy palpated  Neuro: Alert and Oriented x 3, no focal deficits  Psych: Normal mood, normal affect, normal judgement, normal behavior  Skin: Warm, dry, no rashes, no erythema      Shoulder focused exam:     Visual inspection of the shoulder demonstrates normal contour without atrophy  No evidence of scapular dyskinesia or atrophy.  No scapular winging  Active and passive range of motion demonstrates forward flexion to 180, abduction to 120, external rotation is approximately 90 with the arm in 90° of abduction, external rotation is 45 with the arm " the side, internal rotation to Ischial tuberosity.  Rotator cuff strength is 4/5 to resisted forward flexion, 5/5 resisted abduction, 5/5 resisted external rotation, 4/5 resisted internal rotation  No tenderness to palpation at the distal clavicle, AC joint, acromion, or scapular spine  Distal polo deformity present  Pain with cross-body adduction  + Neer's test, - modified Urbano impingement test  Negative external rotation lag sign  Negative belly press, negative lift-off  + speed's test  + tenderness to palpation at the bicipital groove      UE NV Exam: +2 Radial pulses bilaterally  Sensation intact to light touch C5-T1 bilaterally, Radial/median/ulnar nerve motor intact      Bilateral elbow, wrist, and and forearm ROM full, painless with passive ROM, no ttp or crepitance throughout extremities below shoulder joint    Cervical ROM is full without pain, numbness or tingling      Shoulder Imaging    MRI of the right shoulder  from 11/6/2024were reviewed, which demonstrate full-thickness tear of the subscapularis tendon with significant retraction past the glenoid fossa.  There is also a high grade tear of the extra-articular long head of the bicep tendon.  I have reviewed the radiology report and agree with their impression.      Scribe Attestation      I,:  Kameron Canas am acting as a scribe while in the presence of the attending physician.:       I,:  Franc Mcadams, DO personally performed the services described in this documentation    as scribed in my presence.:

## 2025-03-10 ENCOUNTER — HOSPITAL ENCOUNTER (OUTPATIENT)
Dept: HOSPITAL 99 - GI | Age: 65
Discharge: HOME | End: 2025-03-10
Payer: COMMERCIAL

## 2025-03-10 DIAGNOSIS — D12.8: ICD-10-CM

## 2025-03-10 DIAGNOSIS — K63.5: ICD-10-CM

## 2025-03-10 DIAGNOSIS — D12.5: ICD-10-CM

## 2025-03-10 DIAGNOSIS — Z86.0101: ICD-10-CM

## 2025-03-10 DIAGNOSIS — Z12.11: Primary | ICD-10-CM

## 2025-03-10 DIAGNOSIS — K62.1: ICD-10-CM

## 2025-03-10 DIAGNOSIS — D12.3: ICD-10-CM

## 2025-03-10 DIAGNOSIS — K64.8: ICD-10-CM

## 2025-03-10 LAB — GLUCOSE - POINT OF CARE: 123 MG/DL (ref 70–99)

## 2025-03-10 PROCEDURE — 45385 COLONOSCOPY W/LESION REMOVAL: CPT

## 2025-03-10 PROCEDURE — 45380 COLONOSCOPY AND BIOPSY: CPT

## 2025-03-10 PROCEDURE — 88305 TISSUE EXAM BY PATHOLOGIST: CPT

## 2025-05-13 ENCOUNTER — HOSPITAL ENCOUNTER (OUTPATIENT)
Dept: HOSPITAL 99 - RAD | Age: 65
End: 2025-05-13
Payer: COMMERCIAL

## 2025-05-13 DIAGNOSIS — M54.17: Primary | ICD-10-CM

## 2025-08-21 ENCOUNTER — PREP FOR PROCEDURE (OUTPATIENT)
Age: 65
End: 2025-08-21

## 2025-08-21 ENCOUNTER — OFFICE VISIT (OUTPATIENT)
Age: 65
End: 2025-08-21
Payer: COMMERCIAL

## 2025-08-21 VITALS — HEIGHT: 71 IN | BODY MASS INDEX: 39.62 KG/M2 | WEIGHT: 283 LBS

## 2025-08-21 DIAGNOSIS — M54.16 LUMBAR RADICULITIS: Primary | ICD-10-CM

## 2025-08-21 DIAGNOSIS — M43.10 SPONDYLOLYSIS WITH SPONDYLOLISTHESIS: ICD-10-CM

## 2025-08-21 DIAGNOSIS — M54.16 LUMBAR RADICULOPATHY: Primary | ICD-10-CM

## 2025-08-21 PROCEDURE — 99204 OFFICE O/P NEW MOD 45 MIN: CPT | Performed by: STUDENT IN AN ORGANIZED HEALTH CARE EDUCATION/TRAINING PROGRAM

## 2025-08-22 ENCOUNTER — TELEPHONE (OUTPATIENT)
Dept: PAIN MEDICINE | Facility: CLINIC | Age: 65
End: 2025-08-22

## 2025-08-25 PROBLEM — R11.2 PONV (POSTOPERATIVE NAUSEA AND VOMITING): Status: RESOLVED | Noted: 2025-08-25 | Resolved: 2025-08-25

## 2025-08-25 PROBLEM — R11.2 PONV (POSTOPERATIVE NAUSEA AND VOMITING): Status: ACTIVE | Noted: 2025-08-25

## 2025-08-25 PROBLEM — Z98.890 PONV (POSTOPERATIVE NAUSEA AND VOMITING): Status: ACTIVE | Noted: 2025-08-25

## 2025-08-25 PROBLEM — Z98.890 PONV (POSTOPERATIVE NAUSEA AND VOMITING): Status: RESOLVED | Noted: 2025-08-25 | Resolved: 2025-08-25

## (undated) DEVICE — GLOVE INDICATOR PI UNDERGLOVE SZ 7 BLUE

## (undated) DEVICE — GLOVE SRG BIOGEL 7

## (undated) DEVICE — GLOVE SRG BIOGEL 7.5

## (undated) DEVICE — 450 ML BOTTLE OF 0.05% CHLORHEXIDINE GLUCONATE IN 99.95% STERILE WATER FOR IRRIGATION, USP AND APPLICATOR.: Brand: IRRISEPT ANTIMICROBIAL WOUND LAVAGE

## (undated) DEVICE — ANTIBACTERIAL VIOLET BRAIDED (POLYGLACTIN 910), SYNTHETIC ABSORBABLE SURGICAL SUTURE: Brand: COATED VICRYL

## (undated) DEVICE — GLOVE INDICATOR PI UNDERGLOVE SZ 8 BLUE

## (undated) DEVICE — UNIVERSAL MAJOR EXTREMITY,KIT: Brand: CARDINAL HEALTH

## (undated) DEVICE — BETHLEHEM UNIVERSAL  ARTHRO PK: Brand: CARDINAL HEALTH

## (undated) DEVICE — NEEDLE 18 G X 1 1/2

## (undated) DEVICE — TUBING SUCTION 5MM X 12 FT

## (undated) DEVICE — ACE WRAP 6 IN UNSTERILE

## (undated) DEVICE — GLOVE INDICATOR PI UNDERGLOVE SZ 7.5 BLUE

## (undated) DEVICE — NEEDLE HYPO 22G X 1-1/2 IN

## (undated) DEVICE — GLOVE SRG BIOGEL ORTHOPEDIC 7.5

## (undated) DEVICE — CAPIT KNEE ATTUNE RP CEMENT - DEPUY

## (undated) DEVICE — ABDOMINAL PAD: Brand: DERMACEA

## (undated) DEVICE — CAST PADDING 6 IN STERILE

## (undated) DEVICE — DRESSING MEPILEX AG BORDER POST-OP 4 X 8 IN

## (undated) DEVICE — BANDAGE, ESMARK LF STR 6"X9' (20/CS): Brand: CYPRESS

## (undated) DEVICE — SYRINGE 3ML LL

## (undated) DEVICE — IMPERVIOUS STOCKINETTE: Brand: DEROYAL

## (undated) DEVICE — SYRINGE 30ML LL

## (undated) DEVICE — HOOD: Brand: FLYTE, SURGICOOL

## (undated) DEVICE — OCCLUSIVE GAUZE STRIP,3% BISMUTH TRIBROMOPHENATE IN PETROLATUM BLEND: Brand: XEROFORM

## (undated) DEVICE — SAW BLADE RECIPROCATING 179

## (undated) DEVICE — BLADE REAMER PATELLA 46MM

## (undated) DEVICE — ANTIBACTERIAL UNDYED BRAIDED (POLYGLACTIN 910), SYNTHETIC ABSORBABLE SUTURE: Brand: COATED VICRYL

## (undated) DEVICE — MEDI-VAC YANKAUER SUCTION HANDLE W/BULBOUS AND CONTROL VENT: Brand: CARDINAL HEALTH

## (undated) DEVICE — 3M™ STERI-STRIP™ COMPOUND BENZOIN TINCTURE 40 BAGS/CARTON 4 CARTONS/CASE C1544: Brand: 3M™ STERI-STRIP™

## (undated) DEVICE — FILTER STRAW 1.7

## (undated) DEVICE — BLADE SHAVER EXCALIBUR 4MM 13CM COOLCUT

## (undated) DEVICE — THE SIMPULSE SOLO SYSTEM WITH ULTREX RETRACTABLE SPLASH SHIELD TIP: Brand: SIMPULSE SOLO

## (undated) DEVICE — 3M™ STERI-STRIP™ REINFORCED ADHESIVE SKIN CLOSURES, R1547, 1/2 IN X 4 IN (12 MM X 100 MM), 6 STRIPS/ENVELOPE: Brand: 3M™ STERI-STRIP™

## (undated) DEVICE — SUT ETHILON 3-0 PS-1 18 IN 1663H

## (undated) DEVICE — BIPOLAR SEALER 23-301-1 AQM MBS: Brand: AQUAMANTYS™

## (undated) DEVICE — PLUMEPEN PRO 10FT

## (undated) DEVICE — 5065 IMPAD REGULAR PAIR: Brand: A-V IMPULSE

## (undated) DEVICE — CHLORAPREP HI-LITE 26ML ORANGE

## (undated) DEVICE — SPINNING CEMENT MIXING BOWL

## (undated) DEVICE — INTENDED FOR TISSUE SEPARATION, AND OTHER PROCEDURES THAT REQUIRE A SHARP SURGICAL BLADE TO PUNCTURE OR CUT.: Brand: BARD-PARKER SAFETY BLADES SIZE 10, STERILE

## (undated) DEVICE — SUT STRATAFIX SPIRAL 3-0 PGA/PCL 30 X 30 CM SXMD2B408

## (undated) DEVICE — INTENDED FOR TISSUE SEPARATION, AND OTHER PROCEDURES THAT REQUIRE A SHARP SURGICAL BLADE TO PUNCTURE OR CUT.: Brand: BARD-PARKER SAFETY BLADES SIZE 11, STERILE

## (undated) DEVICE — CUFF TOURNIQUET 34 X 4 IN QUICK CONNECT DISP 1BLA

## (undated) DEVICE — YELLOW BOAT

## (undated) DEVICE — HEAVY DUTY TABLE COVER: Brand: CONVERTORS

## (undated) DEVICE — CUFF TOURNIQUET 30 X 4 IN QUICK CONNECT DISP 1BLA

## (undated) DEVICE — 3M™ IOBAN™ 2 ANTIMICROBIAL INCISE DRAPE 6650EZ: Brand: IOBAN™ 2

## (undated) DEVICE — COBAN 6 IN STERILE

## (undated) DEVICE — ASTOUND STANDARD SURGICAL GOWN, XXL: Brand: CONVERTORS

## (undated) DEVICE — BLADE INTREX LRG BONE OSCILLATING

## (undated) DEVICE — TUBING ARTHROSCOPIC WAVE  MAIN PUMP

## (undated) DEVICE — DRAPE SHEET THREE QUARTER

## (undated) DEVICE — SUT VICRYL 0 CT-1 27 IN J260H